# Patient Record
Sex: MALE | Race: WHITE | NOT HISPANIC OR LATINO | Employment: OTHER | URBAN - METROPOLITAN AREA
[De-identification: names, ages, dates, MRNs, and addresses within clinical notes are randomized per-mention and may not be internally consistent; named-entity substitution may affect disease eponyms.]

---

## 2017-01-10 ENCOUNTER — OFFICE VISIT - HEALTHEAST (OUTPATIENT)
Dept: FAMILY MEDICINE | Facility: CLINIC | Age: 42
End: 2017-01-10

## 2017-01-10 DIAGNOSIS — R41.83 BORDERLINE INTELLECTUAL FUNCTIONING: ICD-10-CM

## 2017-01-10 DIAGNOSIS — F63.81 INTERMITTENT EXPLOSIVE DISORDER: ICD-10-CM

## 2017-01-10 ASSESSMENT — MIFFLIN-ST. JEOR: SCORE: 1835.16

## 2017-01-13 ENCOUNTER — COMMUNICATION - HEALTHEAST (OUTPATIENT)
Dept: ADMINISTRATIVE | Facility: CLINIC | Age: 42
End: 2017-01-13

## 2017-02-06 ENCOUNTER — COMMUNICATION - HEALTHEAST (OUTPATIENT)
Dept: ADMINISTRATIVE | Facility: CLINIC | Age: 42
End: 2017-02-06

## 2017-02-07 ENCOUNTER — COMMUNICATION - HEALTHEAST (OUTPATIENT)
Dept: FAMILY MEDICINE | Facility: CLINIC | Age: 42
End: 2017-02-07

## 2018-08-03 ENCOUNTER — RECORDS - HEALTHEAST (OUTPATIENT)
Dept: LAB | Facility: CLINIC | Age: 43
End: 2018-08-03

## 2018-08-03 LAB
ALBUMIN SERPL-MCNC: 3.9 G/DL (ref 3.5–5)
ALP SERPL-CCNC: 93 U/L (ref 45–120)
ALT SERPL W P-5'-P-CCNC: 31 U/L (ref 0–45)
ANION GAP SERPL CALCULATED.3IONS-SCNC: 12 MMOL/L (ref 5–18)
AST SERPL W P-5'-P-CCNC: 25 U/L (ref 0–40)
BILIRUB SERPL-MCNC: 0.5 MG/DL (ref 0–1)
BUN SERPL-MCNC: 14 MG/DL (ref 8–22)
CALCIUM SERPL-MCNC: 8.9 MG/DL (ref 8.5–10.5)
CHLORIDE BLD-SCNC: 103 MMOL/L (ref 98–107)
CHOLEST SERPL-MCNC: 159 MG/DL
CO2 SERPL-SCNC: 27 MMOL/L (ref 22–31)
CREAT SERPL-MCNC: 0.9 MG/DL (ref 0.7–1.3)
FASTING STATUS PATIENT QL REPORTED: YES
GFR SERPL CREATININE-BSD FRML MDRD: >60 ML/MIN/1.73M2
GLUCOSE BLD-MCNC: 91 MG/DL (ref 70–125)
HDLC SERPL-MCNC: 29 MG/DL
LDLC SERPL CALC-MCNC: 61 MG/DL
POTASSIUM BLD-SCNC: 3.6 MMOL/L (ref 3.5–5)
PROT SERPL-MCNC: 7.7 G/DL (ref 6–8)
SODIUM SERPL-SCNC: 142 MMOL/L (ref 136–145)
TRIGL SERPL-MCNC: 346 MG/DL

## 2018-08-06 LAB — 25(OH)D3 SERPL-MCNC: 16.2 NG/ML (ref 30–80)

## 2018-10-03 ENCOUNTER — RECORDS - HEALTHEAST (OUTPATIENT)
Dept: LAB | Facility: HOSPITAL | Age: 43
End: 2018-10-03

## 2018-10-03 LAB
ANION GAP SERPL CALCULATED.3IONS-SCNC: 12 MMOL/L (ref 5–18)
CHLORIDE BLD-SCNC: 102 MMOL/L (ref 98–107)
CO2 SERPL-SCNC: 29 MMOL/L (ref 22–31)
LEVETIRACETAM (KEPPRA): 19 UG/ML (ref 6–46)
POTASSIUM BLD-SCNC: 3.4 MMOL/L (ref 3.5–5)
SODIUM SERPL-SCNC: 143 MMOL/L (ref 136–145)

## 2018-10-05 LAB — METHYLMALONATE SERPL-SCNC: 0.65 UMOL/L (ref 0–0.4)

## 2018-10-19 ENCOUNTER — RECORDS - HEALTHEAST (OUTPATIENT)
Dept: LAB | Facility: CLINIC | Age: 43
End: 2018-10-19

## 2018-10-19 LAB — 25(OH)D3 SERPL-MCNC: 26 NG/ML (ref 30–80)

## 2019-03-25 ENCOUNTER — RECORDS - HEALTHEAST (OUTPATIENT)
Dept: LAB | Facility: HOSPITAL | Age: 44
End: 2019-03-25

## 2019-03-25 LAB — LEVETIRACETAM (KEPPRA): 23.1 UG/ML (ref 6–46)

## 2019-07-17 ENCOUNTER — RECORDS - HEALTHEAST (OUTPATIENT)
Dept: LAB | Facility: CLINIC | Age: 44
End: 2019-07-17

## 2019-07-17 LAB
ALBUMIN SERPL-MCNC: 4.3 G/DL (ref 3.5–5)
ALP SERPL-CCNC: 107 U/L (ref 45–120)
ALT SERPL W P-5'-P-CCNC: 26 U/L (ref 0–45)
ANION GAP SERPL CALCULATED.3IONS-SCNC: 15 MMOL/L (ref 5–18)
AST SERPL W P-5'-P-CCNC: 21 U/L (ref 0–40)
BILIRUB SERPL-MCNC: 0.3 MG/DL (ref 0–1)
BUN SERPL-MCNC: 12 MG/DL (ref 8–22)
CALCIUM SERPL-MCNC: 10.2 MG/DL (ref 8.5–10.5)
CHLORIDE BLD-SCNC: 102 MMOL/L (ref 98–107)
CHOLEST SERPL-MCNC: 177 MG/DL
CO2 SERPL-SCNC: 25 MMOL/L (ref 22–31)
CREAT SERPL-MCNC: 0.89 MG/DL (ref 0.7–1.3)
FASTING STATUS PATIENT QL REPORTED: YES
GFR SERPL CREATININE-BSD FRML MDRD: >60 ML/MIN/1.73M2
GLUCOSE BLD-MCNC: 78 MG/DL (ref 70–125)
HDLC SERPL-MCNC: 29 MG/DL
LDLC SERPL CALC-MCNC: 71 MG/DL
LDLC SERPL CALC-MCNC: ABNORMAL MG/DL
MAGNESIUM SERPL-MCNC: 2 MG/DL (ref 1.8–2.6)
POTASSIUM BLD-SCNC: 3.5 MMOL/L (ref 3.5–5)
PROT SERPL-MCNC: 8.3 G/DL (ref 6–8)
SODIUM SERPL-SCNC: 142 MMOL/L (ref 136–145)
TRIGL SERPL-MCNC: 606 MG/DL
VIT B12 SERPL-MCNC: >2000 PG/ML (ref 213–816)

## 2019-07-18 LAB — 25(OH)D3 SERPL-MCNC: 45 NG/ML (ref 30–80)

## 2019-11-04 ENCOUNTER — HOSPITAL ENCOUNTER (EMERGENCY)
Facility: CLINIC | Age: 44
Discharge: HOME | End: 2019-11-04
Attending: PHYSICIAN ASSISTANT | Admitting: PHYSICIAN ASSISTANT
Payer: COMMERCIAL

## 2019-11-04 ENCOUNTER — APPOINTMENT (OUTPATIENT)
Dept: CT IMAGING | Facility: CLINIC | Age: 44
End: 2019-11-04
Attending: PHYSICIAN ASSISTANT
Payer: COMMERCIAL

## 2019-11-04 ENCOUNTER — APPOINTMENT (OUTPATIENT)
Dept: GENERAL RADIOLOGY | Facility: CLINIC | Age: 44
End: 2019-11-04
Attending: PHYSICIAN ASSISTANT
Payer: COMMERCIAL

## 2019-11-04 VITALS
WEIGHT: 218 LBS | DIASTOLIC BLOOD PRESSURE: 103 MMHG | TEMPERATURE: 97.5 F | HEART RATE: 88 BPM | HEIGHT: 72 IN | BODY MASS INDEX: 29.53 KG/M2 | RESPIRATION RATE: 18 BRPM | OXYGEN SATURATION: 97 % | SYSTOLIC BLOOD PRESSURE: 163 MMHG

## 2019-11-04 DIAGNOSIS — S42.291A HUMERAL HEAD FRACTURE, RIGHT, CLOSED, INITIAL ENCOUNTER: ICD-10-CM
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
## 2019-11-04 DIAGNOSIS — M54.2 NECK PAIN: ICD-10-CM

## 2019-11-04 DIAGNOSIS — R56.9 SEIZURE (H): ICD-10-CM

## 2019-11-04 LAB
ALBUMIN SERPL-MCNC: 3.6 G/DL (ref 3.4–5)
ALP SERPL-CCNC: 96 U/L (ref 40–150)
ALT SERPL W P-5'-P-CCNC: 36 U/L (ref 0–70)
ANION GAP SERPL CALCULATED.3IONS-SCNC: 4 MMOL/L (ref 3–14)
AST SERPL W P-5'-P-CCNC: 22 U/L (ref 0–45)
BASOPHILS # BLD AUTO: 0 10E9/L (ref 0–0.2)
BASOPHILS NFR BLD AUTO: 0.4 %
BILIRUB SERPL-MCNC: 0.2 MG/DL (ref 0.2–1.3)
BUN SERPL-MCNC: 18 MG/DL (ref 7–30)
CALCIUM SERPL-MCNC: 8.9 MG/DL (ref 8.5–10.1)
CHLORIDE SERPL-SCNC: 106 MMOL/L (ref 94–109)
CO2 SERPL-SCNC: 30 MMOL/L (ref 20–32)
CREAT SERPL-MCNC: 0.93 MG/DL (ref 0.66–1.25)
DIFFERENTIAL METHOD BLD: ABNORMAL
EOSINOPHIL # BLD AUTO: 0.1 10E9/L (ref 0–0.7)
EOSINOPHIL NFR BLD AUTO: 0.8 %
ERYTHROCYTE [DISTWIDTH] IN BLOOD BY AUTOMATED COUNT: 12.6 % (ref 10–15)
GFR SERPL CREATININE-BSD FRML MDRD: >90 ML/MIN/{1.73_M2}
GLUCOSE BLDC GLUCOMTR-MCNC: 135 MG/DL (ref 70–99)
GLUCOSE SERPL-MCNC: 99 MG/DL (ref 70–99)
HCT VFR BLD AUTO: 39.9 % (ref 40–53)
HGB BLD-MCNC: 12.8 G/DL (ref 13.3–17.7)
IMM GRANULOCYTES # BLD: 0.1 10E9/L (ref 0–0.4)
IMM GRANULOCYTES NFR BLD: 0.8 %
LYMPHOCYTES # BLD AUTO: 1.2 10E9/L (ref 0.8–5.3)
LYMPHOCYTES NFR BLD AUTO: 13.8 %
MCH RBC QN AUTO: 29.1 PG (ref 26.5–33)
MCHC RBC AUTO-ENTMCNC: 32.1 G/DL (ref 31.5–36.5)
MCV RBC AUTO: 91 FL (ref 78–100)
MONOCYTES # BLD AUTO: 0.5 10E9/L (ref 0–1.3)
MONOCYTES NFR BLD AUTO: 5.9 %
NEUTROPHILS # BLD AUTO: 7 10E9/L (ref 1.6–8.3)
NEUTROPHILS NFR BLD AUTO: 78.3 %
NRBC # BLD AUTO: 0 10*3/UL
NRBC BLD AUTO-RTO: 0 /100
PLATELET # BLD AUTO: 233 10E9/L (ref 150–450)
POTASSIUM SERPL-SCNC: 3.5 MMOL/L (ref 3.4–5.3)
PROT SERPL-MCNC: 7.9 G/DL (ref 6.8–8.8)
RBC # BLD AUTO: 4.4 10E12/L (ref 4.4–5.9)
SODIUM SERPL-SCNC: 140 MMOL/L (ref 133–144)
WBC # BLD AUTO: 9 10E9/L (ref 4–11)

## 2019-11-04 PROCEDURE — 85025 COMPLETE CBC W/AUTO DIFF WBC: CPT | Performed by: PHYSICIAN ASSISTANT

## 2019-11-04 PROCEDURE — 80177 DRUG SCRN QUAN LEVETIRACETAM: CPT | Performed by: PHYSICIAN ASSISTANT

## 2019-11-04 PROCEDURE — 70450 CT HEAD/BRAIN W/O DYE: CPT

## 2019-11-04 PROCEDURE — 99285 EMERGENCY DEPT VISIT HI MDM: CPT | Mod: 25 | Performed by: PHYSICIAN ASSISTANT

## 2019-11-04 PROCEDURE — 96361 HYDRATE IV INFUSION ADD-ON: CPT | Performed by: PHYSICIAN ASSISTANT

## 2019-11-04 PROCEDURE — 00000146 ZZHCL STATISTIC GLUCOSE BY METER IP

## 2019-11-04 PROCEDURE — 96360 HYDRATION IV INFUSION INIT: CPT | Performed by: PHYSICIAN ASSISTANT

## 2019-11-04 PROCEDURE — 99285 EMERGENCY DEPT VISIT HI MDM: CPT | Mod: Z6 | Performed by: PHYSICIAN ASSISTANT

## 2019-11-04 PROCEDURE — 80053 COMPREHEN METABOLIC PANEL: CPT | Performed by: PHYSICIAN ASSISTANT

## 2019-11-04 PROCEDURE — 73030 X-RAY EXAM OF SHOULDER: CPT | Mod: RT

## 2019-11-04 PROCEDURE — 72125 CT NECK SPINE W/O DYE: CPT

## 2019-11-04 PROCEDURE — 25800030 ZZH RX IP 258 OP 636: Performed by: PHYSICIAN ASSISTANT

## 2019-11-04 PROCEDURE — 73200 CT UPPER EXTREMITY W/O DYE: CPT | Mod: RT

## 2019-11-04 RX ORDER — ONDANSETRON 2 MG/ML
4 INJECTION INTRAMUSCULAR; INTRAVENOUS EVERY 30 MIN PRN
Status: DISCONTINUED | OUTPATIENT
Start: 2019-11-04 | End: 2019-11-04

## 2019-11-04 RX ORDER — ERGOCALCIFEROL 1.25 MG/1
50000 CAPSULE, LIQUID FILLED ORAL WEEKLY
COMMUNITY
End: 2023-12-06

## 2019-11-04 RX ORDER — LOSARTAN POTASSIUM 25 MG/1
25 TABLET ORAL 2 TIMES DAILY
COMMUNITY
End: 2021-11-19

## 2019-11-04 RX ORDER — CALCIUM CARBONATE 500 MG/1
1-2 TABLET, CHEWABLE ORAL DAILY PRN
COMMUNITY
End: 2023-12-06

## 2019-11-04 RX ORDER — LEVETIRACETAM 750 MG/1
1500 TABLET ORAL 2 TIMES DAILY
COMMUNITY
Start: 2018-03-20 | End: 2020-11-23

## 2019-11-04 RX ADMIN — SODIUM CHLORIDE 1000 ML: 9 INJECTION, SOLUTION INTRAVENOUS at 10:48

## 2019-11-04 ASSESSMENT — ENCOUNTER SYMPTOMS
GASTROINTESTINAL NEGATIVE: 1
SEIZURES: 1
NECK PAIN: 1
RESPIRATORY NEGATIVE: 1
CONSTITUTIONAL NEGATIVE: 1

## 2019-11-04 ASSESSMENT — MIFFLIN-ST. JEOR: SCORE: 1911.84

## 2019-11-04 NOTE — ED PROVIDER NOTES
"  History     Chief Complaint   Patient presents with     Seizures     2 min seizure in store parking lot at 0900-seizure hx-neck pain c collar placed in ED   rt shoulder pain-possiblt struck head     HPI  Joel Gray is a 44 year old male with history of seizure disorder, hypertension, mild intellectual disabilities, GERD, depression, anxiety who presents via ambulance for evaluation of right shoulder pain and neck pain following a seizure approximately 1-1/2 hours prior to arrival.  Patient arrives with his sister and sister-in-law who witnessed his seizure.  The patient was reportedly walking in the parking lot of a grocery store when he did not see a curb and tripped and fell, landing directly on his right shoulder.  He does not believe he hit his head.  Patient states he got into his van and subsequently \"felt like I was going to have a seizure.\"  Patient's family members state he did in fact start to have a generalized seizure that lasted approximately 2 minutes while in the seat of his band.  Patient's sister states that she stood in the doorway so patient would not fall out of the van.  Patient subsequently had a postictal period that lasted approximately 5 minutes.  He is now alert, oriented, and responding appropriately.  He states his right shoulder is still painful and he is unable to move it.  He denies history of shoulder dislocations.  Patient also complains of midline neck pain but denies headache.  He states he has been feeling well recently without any infectious symptoms.  He states he takes his Keppra 1500 twice daily.  He last took this last night.  Denies fevers, chills, cough, sore throat, nausea, vomiting, diarrhea, abdominal pain, chest pain, shortness of breath, urinary symptoms, or leg pain/swelling.  Patient sees a neurologist at [facility] in [place].  He states he thinks his last seizure was approximately 3 months ago.        Allergies:  Allergies   Allergen " Reactions     Acetaminophen Other (See Comments)     Seizure       Amoxicillin      Carbamazepine      Codeine Other (See Comments)     seizure     Divalproex Sodium [Valproic Acid] Other (See Comments)     Pancreatitis       Iodine Other (See Comments)     Seizure       Morphine      Diatrizoate Rash     Phenytoin Rash     Topamax [Topiramate] Other (See Comments)     Hallucinations         Problem List:    There are no active problems to display for this patient.       Past Medical History:    No past medical history on file.    Past Surgical History:    No past surgical history on file.    Family History:    No family history on file.    Social History:  Marital Status:    Social History     Tobacco Use     Smoking status: Not on file   Substance Use Topics     Alcohol use: Not on file     Drug use: Not on file        Medications:    calcium carbonate (TUMS) 500 MG chewable tablet  Cyanocobalamin (VITAMIN B-12 PO)  levETIRAcetam (KEPPRA) 750 MG tablet  losartan (COZAAR) 25 MG tablet  omeprazole (PRILOSEC) 20 MG DR capsule  vitamin D2 (ERGOCALCIFEROL) 31178 units (1250 mcg) capsule          Review of Systems   Constitutional: Negative.    HENT: Negative.    Respiratory: Negative.    Gastrointestinal: Negative.    Musculoskeletal: Positive for neck pain.        Right shoulder pain and limited ROM   Skin: Negative.    Neurological: Positive for seizures.   All other systems reviewed and are negative.      Physical Exam   BP: 134/87  Pulse: 84  Heart Rate: 71  Temp: 97.5  F (36.4  C)  Resp: 13  Height: 182.9 cm (6')  Weight: 98.9 kg (218 lb)  SpO2: 97 %      Physical Exam  Constitutional:       General: He is not in acute distress.     Appearance: He is well-developed. He is not ill-appearing, toxic-appearing or diaphoretic.      Interventions: Cervical collar in place.   HENT:      Head: Normocephalic and atraumatic. No raccoon eyes, abrasion, contusion or laceration.      Jaw: There is normal jaw occlusion.       Right Ear: Tympanic membrane, ear canal and external ear normal. No hemotympanum.      Left Ear: Tympanic membrane, ear canal and external ear normal. No hemotympanum.      Nose: Nose normal. No rhinorrhea.      Mouth/Throat:      Lips: Pink.      Mouth: Mucous membranes are moist.      Tongue: No lesions.      Pharynx: Oropharynx is clear. No oropharyngeal exudate or posterior oropharyngeal erythema.      Comments: No tongue laceration  Eyes:      Conjunctiva/sclera: Conjunctivae normal.      Pupils: Pupils are equal, round, and reactive to light.   Neck:      Musculoskeletal: Neck supple. Spinous process tenderness present. No edema or neck rigidity.   Cardiovascular:      Rate and Rhythm: Normal rate and regular rhythm.      Heart sounds: Normal heart sounds.   Pulmonary:      Effort: Pulmonary effort is normal. No respiratory distress.      Breath sounds: Normal breath sounds and air entry. No stridor, decreased air movement or transmitted upper airway sounds. No decreased breath sounds, wheezing, rhonchi or rales.   Abdominal:      General: Bowel sounds are normal.      Palpations: Abdomen is soft.      Tenderness: There is no tenderness.   Musculoskeletal: Normal range of motion.      Right shoulder: He exhibits bony tenderness, swelling and deformity.      Left shoulder: Normal. He exhibits normal range of motion, no tenderness, no bony tenderness, no swelling and no deformity.      Right elbow: Normal.     Cervical back: He exhibits bony tenderness.      Right upper arm: Normal.   Lymphadenopathy:      Cervical: No cervical adenopathy.   Skin:     General: Skin is warm and dry.   Neurological:      General: No focal deficit present.      Mental Status: He is alert and oriented to person, place, and time. Mental status is at baseline.      Cranial Nerves: Cranial nerves are intact.      Sensory: Sensation is intact.      Motor: Motor function is intact.      Coordination: Coordination is intact.    Psychiatric:         Mood and Affect: Mood normal.         Behavior: Behavior is cooperative.         ED Course        Procedures      No results found for this or any previous visit (from the past 24 hour(s)).     Results for orders placed or performed during the hospital encounter of 11/04/19   Glucose by meter     Status: Abnormal   Result Value Ref Range    Glucose 135 (H) 70 - 99 mg/dL   CBC with platelets differential     Status: Abnormal   Result Value Ref Range    WBC 9.0 4.0 - 11.0 10e9/L    RBC Count 4.40 4.4 - 5.9 10e12/L    Hemoglobin 12.8 (L) 13.3 - 17.7 g/dL    Hematocrit 39.9 (L) 40.0 - 53.0 %    MCV 91 78 - 100 fl    MCH 29.1 26.5 - 33.0 pg    MCHC 32.1 31.5 - 36.5 g/dL    RDW 12.6 10.0 - 15.0 %    Platelet Count 233 150 - 450 10e9/L    Diff Method Automated Method     % Neutrophils 78.3 %    % Lymphocytes 13.8 %    % Monocytes 5.9 %    % Eosinophils 0.8 %    % Basophils 0.4 %    % Immature Granulocytes 0.8 %    Nucleated RBCs 0 0 /100    Absolute Neutrophil 7.0 1.6 - 8.3 10e9/L    Absolute Lymphocytes 1.2 0.8 - 5.3 10e9/L    Absolute Monocytes 0.5 0.0 - 1.3 10e9/L    Absolute Eosinophils 0.1 0.0 - 0.7 10e9/L    Absolute Basophils 0.0 0.0 - 0.2 10e9/L    Abs Immature Granulocytes 0.1 0 - 0.4 10e9/L    Absolute Nucleated RBC 0.0    Comprehensive metabolic panel     Status: None   Result Value Ref Range    Sodium 140 133 - 144 mmol/L    Potassium 3.5 3.4 - 5.3 mmol/L    Chloride 106 94 - 109 mmol/L    Carbon Dioxide 30 20 - 32 mmol/L    Anion Gap 4 3 - 14 mmol/L    Glucose 99 70 - 99 mg/dL    Urea Nitrogen 18 7 - 30 mg/dL    Creatinine 0.93 0.66 - 1.25 mg/dL    GFR Estimate >90 >60 mL/min/[1.73_m2]    GFR Estimate If Black >90 >60 mL/min/[1.73_m2]    Calcium 8.9 8.5 - 10.1 mg/dL    Bilirubin Total 0.2 0.2 - 1.3 mg/dL    Albumin 3.6 3.4 - 5.0 g/dL    Protein Total 7.9 6.8 - 8.8 g/dL    Alkaline Phosphatase 96 40 - 150 U/L    ALT 36 0 - 70 U/L    AST 22 0 - 45 U/L   Keppra (Levetiracetam) Level      "Status: None   Result Value Ref Range    Keppra (Levetiracetam) Level 17 12 - 46 ug/mL         Medications   0.9% sodium chloride BOLUS (0 mLs Intravenous Stopped 11/4/19 1421)       Assessments & Plan (with Medical Decision Making)     Pt is a 44 year old male with history of seizure disorder, hypertension, mild intellectual disabilities, GERD, depression, anxiety who presents via ambulance for evaluation of right shoulder pain and neck pain following a seizure approximately 1-1/2 hours prior to arrival.  Pt patient was reportedly walking in the parking lot of a grocery store when he did not see a curb and tripped and fell, landing directly on his right shoulder.  He does not believe he hit his head.  Patient states he got into his van and subsequently \"felt like I was going to have a seizure.\"  Patient's family members state he did in fact start to have a generalized seizure that lasted approximately 2 minutes while in the seat of his band.  Patient subsequently had a postictal period that lasted approximately 5 minutes.  He is now alert, oriented, and responding appropriately.  He states his right shoulder is still painful and he is unable to move it.  Pt takes his Keppra 1500 twice daily.  Pt is afebrile on arrival.  Exam as above.  CT of patient's head and neck were obtained given his trauma followed by seizure.  CT of the head was negative for intracranial hemorrhage or acute pathology.  CT of the cervical spine was negative for fracture.  X-ray of the right shoulder shows concern for possible impacted fracture of the right humeral head and therefore CT scan was recommended in order to confirm.  Therefore a CT of the right shoulder was obtained and shows an impaction fracture of the anterior humeral head (reverse Hill-Sachs fracture).  Discussed results with patient.  Suspect patient dislocated his right shoulder when he fell directly on this right shoulder prior to developing a seizure.  Patient states when " "he was getting x-rays of his right shoulder, he felt a \"pop.\"  I suspect this is when patient's dislocation spontaneously reduced.  Patient was placed in a sling given his humeral head impaction fracture.  He is declining any pain medication at this time and states he does not tolerate any sort of pain medication or over-the-counter NSAIDs.  Patient's Keppra levels are currently pending.  He has a follow-up with his neurologist in a couple weeks.  Return precautions were reviewed.  Hand-outs were provided.    Patient was instructed to follow-up with orthopedics in 1 to 2 days for continued care and management (referral was made).  He is to return to the ED for persistent and/or worsening symptoms.  Patient expressed understanding of the diagnosis and plan and was discharged home in good condition.    I have reviewed the nursing notes.    I have reviewed the findings, diagnosis, plan and need for follow up with the patient.    Discharge Medication List as of 11/4/2019  2:21 PM          Final diagnoses:   Humeral head fracture, right, closed, initial encounter   Seizure (H)   Neck pain   Fall, initial encounter       11/4/2019   [facility] EMERGENCY DEPARTMENT      Disclaimer:  This note consists of symbols derived from keyboarding, dictation and/or voice recognition software.  As a result, there may be errors in the script that have gone undetected.  Please consider this when interpreting information found in this chart.     Yessi Pearson PA-C  11/05/19 2301    "

## 2019-11-04 NOTE — ED AVS SNAPSHOT
[facility] Emergency Department  [address] [address]  Phone:  [phone]  Fax:  [phone]                                    Joel Gray   MRN: [id number]    Department:  [facility] Emergency Department   Date of Visit:  11/4/2019           After Visit Summary Signature Page    I have received my discharge instructions, and my questions have been answered. I have discussed any challenges I see with this plan with the nurse or doctor.    ..........................................................................................................................................  Patient/Patient Representative Signature      ..........................................................................................................................................  Patient Representative Print Name and Relationship to Patient    ..................................................               ................................................  Date                                   Time    ..........................................................................................................................................  Reviewed by Signature/Title    ...................................................              ..............................................  Date                                               Time          22EPIC Rev 08/18

## 2019-11-04 NOTE — ED NOTES
2 min GM seizure in store parking lot at 0900-seizure hx with last seizure approx 3 mos ago-rt shoulder and neck pain-c collar placed in ED-seizure pads placed-ice to rt shoulder

## 2019-11-05 LAB — LEVETIRACETAM SERPL-MCNC: 17 UG/ML (ref 12–46)

## 2019-11-25 DIAGNOSIS — G40.209 COMPLEX PARTIAL SEIZURES WITH CONSCIOUSNESS IMPAIRED (H): Primary | ICD-10-CM

## 2019-11-25 PROCEDURE — 36415 COLL VENOUS BLD VENIPUNCTURE: CPT | Performed by: FAMILY MEDICINE

## 2019-11-25 PROCEDURE — 80177 DRUG SCRN QUAN LEVETIRACETAM: CPT | Mod: 90 | Performed by: FAMILY MEDICINE

## 2019-11-25 PROCEDURE — 99000 SPECIMEN HANDLING OFFICE-LAB: CPT | Performed by: FAMILY MEDICINE

## 2019-11-26 LAB — LEVETIRACETAM SERPL-MCNC: 26 UG/ML (ref 12–46)

## 2020-07-13 PROBLEM — R41.83 BORDERLINE INTELLECTUAL FUNCTIONING: Status: ACTIVE | Noted: 2017-01-10

## 2020-07-13 PROBLEM — F06.30 MOOD DISORDER DUE TO A GENERAL MEDICAL CONDITION: Status: ACTIVE | Noted: 2020-07-13

## 2020-07-13 PROBLEM — R44.3 HALLUCINATIONS: Status: ACTIVE | Noted: 2018-09-12

## 2020-09-29 ENCOUNTER — TELEPHONE (OUTPATIENT)
Dept: NEUROLOGY | Facility: CLINIC | Age: 45
End: 2020-09-29

## 2020-09-29 NOTE — TELEPHONE ENCOUNTER
Alysia from his group home where he moved to has called us to update all his info, including new pharmacy info. He now uses [facility] at [address] [place] [address]. [place] [address] phone number is [phone] and fax number is [phone] Thank you!

## 2020-10-17 ENCOUNTER — APPOINTMENT (OUTPATIENT)
Dept: GENERAL RADIOLOGY | Facility: CLINIC | Age: 45
End: 2020-10-17
Attending: NURSE PRACTITIONER
Payer: COMMERCIAL

## 2020-10-17 ENCOUNTER — HOSPITAL ENCOUNTER (EMERGENCY)
Facility: CLINIC | Age: 45
Discharge: HOME | End: 2020-10-17
Attending: NURSE PRACTITIONER | Admitting: NURSE PRACTITIONER
Payer: COMMERCIAL

## 2020-10-17 VITALS
DIASTOLIC BLOOD PRESSURE: 101 MMHG | HEART RATE: 100 BPM | TEMPERATURE: 97 F | RESPIRATION RATE: 18 BRPM | SYSTOLIC BLOOD PRESSURE: 156 MMHG | WEIGHT: 220 LBS | BODY MASS INDEX: 29.84 KG/M2 | OXYGEN SATURATION: 96 %

## 2020-10-17 DIAGNOSIS — Z20.822 ENCOUNTER FOR LABORATORY TESTING FOR COVID-19 VIRUS: ICD-10-CM

## 2020-10-17 DIAGNOSIS — R00.0 TACHYCARDIA: ICD-10-CM

## 2020-10-17 LAB
ANION GAP SERPL CALCULATED.3IONS-SCNC: 4 MMOL/L (ref 3–14)
BASOPHILS # BLD AUTO: 0.1 10E9/L (ref 0–0.2)
BASOPHILS NFR BLD AUTO: 0.6 %
BUN SERPL-MCNC: 16 MG/DL (ref 7–30)
CALCIUM SERPL-MCNC: 9.5 MG/DL (ref 8.5–10.1)
CHLORIDE SERPL-SCNC: 103 MMOL/L (ref 94–109)
CO2 SERPL-SCNC: 32 MMOL/L (ref 20–32)
CREAT SERPL-MCNC: 0.98 MG/DL (ref 0.66–1.25)
D DIMER PPP FEU-MCNC: <0.3 UG/ML FEU (ref 0–0.5)
DIFFERENTIAL METHOD BLD: NORMAL
EOSINOPHIL # BLD AUTO: 0.1 10E9/L (ref 0–0.7)
EOSINOPHIL NFR BLD AUTO: 1.6 %
ERYTHROCYTE [DISTWIDTH] IN BLOOD BY AUTOMATED COUNT: 12.6 % (ref 10–15)
GFR SERPL CREATININE-BSD FRML MDRD: >90 ML/MIN/{1.73_M2}
GLUCOSE SERPL-MCNC: 83 MG/DL (ref 70–99)
HCT VFR BLD AUTO: 41.3 % (ref 40–53)
HGB BLD-MCNC: 13.6 G/DL (ref 13.3–17.7)
IMM GRANULOCYTES # BLD: 0 10E9/L (ref 0–0.4)
IMM GRANULOCYTES NFR BLD: 0.2 %
LYMPHOCYTES # BLD AUTO: 1.3 10E9/L (ref 0.8–5.3)
LYMPHOCYTES NFR BLD AUTO: 15.8 %
MCH RBC QN AUTO: 30.2 PG (ref 26.5–33)
MCHC RBC AUTO-ENTMCNC: 32.9 G/DL (ref 31.5–36.5)
MCV RBC AUTO: 92 FL (ref 78–100)
MONOCYTES # BLD AUTO: 0.7 10E9/L (ref 0–1.3)
MONOCYTES NFR BLD AUTO: 8 %
NEUTROPHILS # BLD AUTO: 6 10E9/L (ref 1.6–8.3)
NEUTROPHILS NFR BLD AUTO: 73.8 %
NRBC # BLD AUTO: 0 10*3/UL
NRBC BLD AUTO-RTO: 0 /100
PLATELET # BLD AUTO: 297 10E9/L (ref 150–450)
POTASSIUM SERPL-SCNC: 3.9 MMOL/L (ref 3.4–5.3)
RBC # BLD AUTO: 4.5 10E12/L (ref 4.4–5.9)
SODIUM SERPL-SCNC: 139 MMOL/L (ref 133–144)
TROPONIN I SERPL-MCNC: <0.015 UG/L (ref 0–0.04)
WBC # BLD AUTO: 8.2 10E9/L (ref 4–11)

## 2020-10-17 PROCEDURE — 99285 EMERGENCY DEPT VISIT HI MDM: CPT | Mod: 25 | Performed by: NURSE PRACTITIONER

## 2020-10-17 PROCEDURE — 80048 BASIC METABOLIC PNL TOTAL CA: CPT | Performed by: NURSE PRACTITIONER

## 2020-10-17 PROCEDURE — 85379 FIBRIN DEGRADATION QUANT: CPT | Performed by: NURSE PRACTITIONER

## 2020-10-17 PROCEDURE — 71045 X-RAY EXAM CHEST 1 VIEW: CPT

## 2020-10-17 PROCEDURE — 85025 COMPLETE CBC W/AUTO DIFF WBC: CPT | Performed by: NURSE PRACTITIONER

## 2020-10-17 PROCEDURE — U0003 INFECTIOUS AGENT DETECTION BY NUCLEIC ACID (DNA OR RNA); SEVERE ACUTE RESPIRATORY SYNDROME CORONAVIRUS 2 (SARS-COV-2) (CORONAVIRUS DISEASE [COVID-19]), AMPLIFIED PROBE TECHNIQUE, MAKING USE OF HIGH THROUGHPUT TECHNOLOGIES AS DESCRIBED BY CMS-2020-01-R: HCPCS | Performed by: NURSE PRACTITIONER

## 2020-10-17 PROCEDURE — 93005 ELECTROCARDIOGRAM TRACING: CPT | Performed by: NURSE PRACTITIONER

## 2020-10-17 PROCEDURE — 84484 ASSAY OF TROPONIN QUANT: CPT | Performed by: NURSE PRACTITIONER

## 2020-10-17 PROCEDURE — 93010 ELECTROCARDIOGRAM REPORT: CPT | Performed by: NURSE PRACTITIONER

## 2020-10-17 PROCEDURE — C9803 HOPD COVID-19 SPEC COLLECT: HCPCS | Performed by: NURSE PRACTITIONER

## 2020-10-17 RX ORDER — RISPERIDONE 2 MG/1
2 TABLET ORAL
COMMUNITY
Start: 2020-09-17 | End: 2021-11-19

## 2020-10-17 NOTE — ED PROVIDER NOTES
"  History     Chief Complaint   Patient presents with     Hypertension     back pain, SOA, feels \"off\"     HPI  Joel Gray is a 44 year old male with history of seizure disorder, hypertension, mild intellectual disabilities, GERD, depression, anxiety, episode of psychosis, aggressive behavior who presents to the ED with rapid heart, shortness of breath, and cough.  Pt states the rapid heart started this am around 9 am.    Pt states it has been doing this off and on for many years.    Pt states the cough has been noted in the shower for the past couple of week.  PT states drinking water helps.     Pt states the shortness of breath start this am when waking up. Activity aggravates this, rest relieves this.    Patient denies fever, aches, chills, sweats, ear pain, eye pain, throat pain, chest pain, cough, wheezing, shortness of breath, abdominal pain, nausea, vomiting, diarrhea, dysuria, speech difficulty, left or right-sided body weakness, mental confusion, thoughts of harming self.  Patient reports feeling well otherwise      Allergies:  Allergies   Allergen Reactions     Acetaminophen Other (See Comments)     Seizure       Amoxicillin      Carbamazepine      Codeine Other (See Comments)     seizure     Divalproex Sodium [Valproic Acid] Other (See Comments)     Pancreatitis       Iodine Other (See Comments)     Seizure       Morphine      Diatrizoate Rash     Phenytoin Rash     Topamax [Topiramate] Other (See Comments)     Hallucinations         Problem List:    Patient Active Problem List    Diagnosis Date Noted     Mood disorder due to a general medical condition 07/13/2020     Priority: Medium     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 Covarrubias       Intermittent explosive disorder 09/05/2015     Priority: Medium     " Psychosis (H) 09/04/2015     Priority: Medium     HTN (hypertension) 08/30/2009     Priority: Medium     Updated by system to replace inactive record  Updated by system to replace inactive record       Dysphagia 01/30/2009     Priority: Medium     Dysphonia 01/30/2009     Priority: Medium     Anxiety state 04/20/2006     Priority: Medium     Depressive disorder 04/20/2006     Priority: Medium     Esophageal reflux 04/20/2006     Priority: Medium        Past Medical History:    No past medical history on file.    Past Surgical History:    No past surgical history on file.    Family History:    No family history on file.    Social History:  Marital Status:  Single [1]  Social History     Tobacco Use     Smoking status: Not on file   Substance Use Topics     Alcohol use: Not on file     Drug use: Not on file        Medications:         risperiDONE (RISPERDAL) 2 MG tablet       calcium carbonate (TUMS) 500 MG chewable tablet       Cyanocobalamin (VITAMIN B-12 PO)       levETIRAcetam (KEPPRA) 750 MG tablet       losartan (COZAAR) 25 MG tablet       omeprazole (PRILOSEC) 20 MG DR capsule       vitamin D2 (ERGOCALCIFEROL) 05300 units (1250 mcg) capsule          Review of Systems  As mentioned above in the history present illness. All other systems were reviewed and are negative.    Physical Exam   BP: (!) 151/86  Pulse: 109  Temp: 97  F (36.1  C)  Resp: 16  Weight: 99.8 kg (220 lb)  SpO2: 96 %      Physical Exam  Vitals signs and nursing note reviewed.   Constitutional:       General: He is not in acute distress.     Appearance: Normal appearance. He is well-developed and normal weight. He is not ill-appearing, toxic-appearing or diaphoretic.   HENT:      Head: Normocephalic and atraumatic.      Right Ear: Tympanic membrane, ear canal and external ear normal.      Left Ear: Tympanic membrane, ear canal and external ear normal.      Nose: Nose normal.      Mouth/Throat:      Mouth: Mucous membranes are moist.      Pharynx:  No oropharyngeal exudate or posterior oropharyngeal erythema.   Eyes:      General:         Right eye: No discharge.         Left eye: No discharge.      Extraocular Movements: Extraocular movements intact.      Conjunctiva/sclera: Conjunctivae normal.      Pupils: Pupils are equal, round, and reactive to light.   Cardiovascular:      Rate and Rhythm: Regular rhythm. Tachycardia present.      Heart sounds: Normal heart sounds. No murmur. No friction rub. No gallop.    Pulmonary:      Effort: Pulmonary effort is normal.      Breath sounds: Normal breath sounds. No stridor. No wheezing.   Abdominal:      General: Abdomen is flat. Bowel sounds are normal. There is no distension.      Palpations: Abdomen is soft.      Tenderness: There is no abdominal tenderness. There is no right CVA tenderness, left CVA tenderness, guarding or rebound.      Hernia: No hernia is present.   Skin:     General: Skin is warm.      Capillary Refill: Capillary refill takes less than 2 seconds.      Findings: No rash.   Neurological:      General: No focal deficit present.      Mental Status: He is alert and oriented to person, place, and time.   Psychiatric:         Mood and Affect: Mood normal.         Behavior: Behavior normal.       As mentioned above in the history present illness. All other systems were reviewed and are negative.    ED Course     ED Course as of Oct 17 1821   Sat Oct 17, 2020   1711 Called into room and discussed lab results including unremarkable troponin, negative D-dimer for possible DVT or PE, normal basic metabolic panel, unremarkable CBC.  Advised ordered portable chest x-ray and will see where to go from there.  Discussed etiology of elevated heart rate may be lack of fluids, viral etiology including Covid.  Patient verbalized understanding.  Patient states no worsening of symptoms and states he feels fine presently.        Procedures         EKG Interpretation:      Interpreted by YAMILA Gallagher CNP,  Jaleel Malagon MD  Time reviewed: 1625  Symptoms at time of EKG: short of breath   Rhythm: normal sinus   Rate: Tachycardia  Axis: Normal  Ectopy: none  ST Segments/ T Waves: No ST-T wave changes and No acute ischemic changes  Q Waves: II, III and aVf  Comparison to prior: No old EKG available    Clinical Impression: no acute changes    Results for orders placed or performed during the hospital encounter of 10/17/20 (from the past 24 hour(s))   CBC with platelets differential   Result Value Ref Range    WBC 8.2 4.0 - 11.0 10e9/L    RBC Count 4.50 4.4 - 5.9 10e12/L    Hemoglobin 13.6 13.3 - 17.7 g/dL    Hematocrit 41.3 40.0 - 53.0 %    MCV 92 78 - 100 fl    MCH 30.2 26.5 - 33.0 pg    MCHC 32.9 31.5 - 36.5 g/dL    RDW 12.6 10.0 - 15.0 %    Platelet Count 297 150 - 450 10e9/L    Diff Method Automated Method     % Neutrophils 73.8 %    % Lymphocytes 15.8 %    % Monocytes 8.0 %    % Eosinophils 1.6 %    % Basophils 0.6 %    % Immature Granulocytes 0.2 %    Nucleated RBCs 0 0 /100    Absolute Neutrophil 6.0 1.6 - 8.3 10e9/L    Absolute Lymphocytes 1.3 0.8 - 5.3 10e9/L    Absolute Monocytes 0.7 0.0 - 1.3 10e9/L    Absolute Eosinophils 0.1 0.0 - 0.7 10e9/L    Absolute Basophils 0.1 0.0 - 0.2 10e9/L    Abs Immature Granulocytes 0.0 0 - 0.4 10e9/L    Absolute Nucleated RBC 0.0    D dimer quantitative   Result Value Ref Range    D Dimer <0.3 0.0 - 0.50 ug/ml FEU   Basic metabolic panel   Result Value Ref Range    Sodium 139 133 - 144 mmol/L    Potassium 3.9 3.4 - 5.3 mmol/L    Chloride 103 94 - 109 mmol/L    Carbon Dioxide 32 20 - 32 mmol/L    Anion Gap 4 3 - 14 mmol/L    Glucose 83 70 - 99 mg/dL    Urea Nitrogen 16 7 - 30 mg/dL    Creatinine 0.98 0.66 - 1.25 mg/dL    GFR Estimate >90 >60 mL/min/[1.73_m2]    GFR Estimate If Black >90 >60 mL/min/[1.73_m2]    Calcium 9.5 8.5 - 10.1 mg/dL   Troponin I   Result Value Ref Range    Troponin I ES <0.015 0.000 - 0.045 ug/L   XR Chest Port 1 View    Narrative    XR CHEST PORT 1 VW  10/17/2020 5:47 PM    HISTORY: shortness of breath    COMPARISON: None.      Impression    IMPRESSION: No focal infiltrate, effusion or pneumothorax. The cardiac  and mediastinal silhouettes are within normal limits. Right upper  quadrant surgical clips.    RADHA BAZAN MD       Medications - No data to display    Assessments & Plan (with Medical Decision Making)  Joel Gray is a 44 year old male with history of seizure disorder, hypertension, mild intellectual disabilities, GERD, depression, anxiety, episode of psychosis, aggressive behavior who presents to the ED with rapid heart, shortness of breath, and cough.  Patient lives in a group home.  There is no one else presently with Covid type symptoms.  There is someone else in the group home recently diagnosed with bronchitis.  Patient denies any history of DVT, PE.  On exam patient is noted to be tachycardic, but denies chest pain and there is no appreciable shortness of air presently.  There is no respiratory distress.  Patient has 16 respirations per minute and O2 saturation of 96%.  Lung sounds are clear to auscultation.  Remainder of exam is unremarkable.  CBC and basic metabolic panel completed.  D-dimer completed, EKG and troponin ordered to evaluate for possible STEMI or acute coronary syndrome.  D-dimer is negative, EKG and troponin are no signs of acute coronary are NSTEMI.  CBC and basic metabolic panel are unremarkable.  Chest x-ray is unremarkable.  Final differential viral upper respiratory illness, viral bronchitis, tachycardia due to decreased fluids and this is less likely.  Covid precautions reviewed.  Discharge instructions given.  Discussed worrisome reasons with which to return.  Patient discharged in stable condition.     I have reviewed the nursing notes.    I have reviewed the findings, diagnosis, plan and need for follow up with the patient.    New Prescriptions    No medications on file       Final diagnoses:   Tachycardia    Encounter for laboratory testing for COVID-19 virus       10/17/2020   [facility] EMERGENCY DEPT     Moni Haro, APRN CNP  10/17/20 8597

## 2020-10-17 NOTE — ED NOTES
Pt from group [place]. Pt is worried about his htn. Takes meds for it and took them this am. Also feeling soa x 1 hour. Denies any other sx

## 2020-10-17 NOTE — ED NOTES
Pt discharged with instructions and caregiver. Will follow up with MD next week or return to the ED. Denies SOB or headache on departure,

## 2020-10-17 NOTE — ED AVS SNAPSHOT
[facility]  [address] [address]  Phone: [phone]  Fax: [phone]                                    Joel Gray   MRN: [id number]    Department: [facility]   Date of Visit: 10/17/2020           After Visit Summary Signature Page    I have received my discharge instructions, and my questions have been answered. I have discussed any challenges I see with this plan with the nurse or doctor.    ..........................................................................................................................................  Patient/Patient Representative Signature      ..........................................................................................................................................  Patient Representative Print Name and Relationship to Patient    ..................................................               ................................................  Date                                   Time    ..........................................................................................................................................  Reviewed by Signature/Title    ...................................................              ..............................................  Date                                               Time          22EPIC Rev 08/18

## 2020-10-17 NOTE — DISCHARGE INSTRUCTIONS
Please increase your fluid intake.  You have been tested for the Covid virus.  I recommend self quarantine until the results are available.  If the Covid test is positive you need to continue to quarantine for 14 days.  Return to the emergency room if you have increasing shortness of breath or chest pain.

## 2020-10-18 LAB
SARS-COV-2 RNA SPEC QL NAA+PROBE: NOT DETECTED
SPECIMEN SOURCE: NORMAL

## 2020-10-19 ENCOUNTER — TELEPHONE (OUTPATIENT)
Dept: EMERGENCY MEDICINE | Facility: CLINIC | Age: 45
End: 2020-10-19

## 2020-10-19 NOTE — TELEPHONE ENCOUNTER
Coronavirus (COVID-19) Notification    Lab Result   Lab test 2019-nCoV rRt-PCR OR SARS-COV-2 PCR    Nasopharyngeal AND/OR Oropharyngeal swab is NEGATIVE for 2019-nCoV RNA [OR] SARS-COV-2 RNA (COVID-19) RNA    Your result was negative. This means that we didn't find the virus that causes COVID-19 in your sample. A test may show negative when you do actually have the virus. This can happen when the virus is in the early stages of infection, before you feel illness symptoms.    If you have symptoms   Stay home and away from others (self-isolate) until you meet ALL of the guidelines below:    You've had no fever--and no medicine that reduces fever--for 1 full day (24 hours). And      Your other symptoms have gotten better. For example, your cough or breathing has improved. And   ; At least 10 days have passed since your symptoms started. (If you've been told by a doctor that you have a weak immune system, wait 20 days.)         During this time:    Stay home. Don't go to work, school or anywhere else.     Stay in your own room, including for meals. Use your own bathroom if you can.    Stay away from others in your home. No hugging, kissing or shaking hands. No visitors.    Clean  high touch  surfaces often (doorknobs, counters, handles, etc.). Use a household cleaning spray or wipes. You can find a full list on the EPA website at www.epa.gov/pesticide-registration/list-n-disinfectants-use-against-sars-cov-2.    Cover your mouth and nose with a mask, tissue or other face covering to avoid spreading germs.    Wash your hands and face often with soap and water.    Going back to work  Check with your employer for any guidelines to follow for going back to work.  You are sent a letter for your Employer which will serve as formal document notice that you, the employee, tested negative for COVID-19, as of the testing date shown above.    If your symptoms worsen or other concerning symptoms, contact PCP, oncare or consider  returning to Emergency Dept.    Where can I get more information?    [facility]: [email or web address]    Coronavirus Basics: www.health.[facility].[place].us/diseases/coronavirus/basics.html    [facility] Hotline (721-812-3896)    {Name]  Crys Goodman RN  [facility] RN  Lung Nodule and ED Lab Result RN  Epic pool (ED late result f/u RN): [id number]  [facility] INCIDENTAL RADIOLOGY F/U NURSES: [id number]  Ph# [phone]

## 2020-11-19 ENCOUNTER — RECORDS - HEALTHEAST (OUTPATIENT)
Dept: LAB | Facility: CLINIC | Age: 45
End: 2020-11-19

## 2020-11-19 LAB
CHOLEST SERPL-MCNC: 181 MG/DL
FASTING STATUS PATIENT QL REPORTED: ABNORMAL
HDLC SERPL-MCNC: 42 MG/DL
LDLC SERPL CALC-MCNC: 89 MG/DL
TRIGL SERPL-MCNC: 249 MG/DL

## 2020-11-19 SDOH — HEALTH STABILITY: MENTAL HEALTH: HOW OFTEN DO YOU HAVE A DRINK CONTAINING ALCOHOL?: NOT ASKED

## 2020-11-19 SDOH — HEALTH STABILITY: MENTAL HEALTH: HOW MANY STANDARD DRINKS CONTAINING ALCOHOL DO YOU HAVE ON A TYPICAL DAY?: NOT ASKED

## 2020-11-19 SDOH — HEALTH STABILITY: MENTAL HEALTH: HOW OFTEN DO YOU HAVE 6 OR MORE DRINKS ON ONE OCCASION?: NOT ASKED

## 2020-11-20 LAB — 25(OH)D3 SERPL-MCNC: 53.2 NG/ML (ref 30–80)

## 2020-11-23 ENCOUNTER — TELEPHONE (OUTPATIENT)
Dept: NEUROLOGY | Facility: CLINIC | Age: 45
End: 2020-11-23

## 2020-11-23 ENCOUNTER — VIRTUAL VISIT (OUTPATIENT)
Dept: NEUROLOGY | Facility: CLINIC | Age: 45
End: 2020-11-23
Payer: COMMERCIAL

## 2020-11-23 VITALS — HEIGHT: 72 IN | BODY MASS INDEX: 26.68 KG/M2 | WEIGHT: 197 LBS

## 2020-11-23 DIAGNOSIS — E53.8 COBALAMIN DEFICIENCY: ICD-10-CM

## 2020-11-23 DIAGNOSIS — G40.209 PARTIAL SYMPTOMATIC EPILEPSY WITH COMPLEX PARTIAL SEIZURES, NOT INTRACTABLE, WITHOUT STATUS EPILEPTICUS (H): Primary | ICD-10-CM

## 2020-11-23 DIAGNOSIS — F44.5 PSEUDOSEIZURES: ICD-10-CM

## 2020-11-23 PROBLEM — R44.3 HALLUCINATIONS: Status: RESOLVED | Noted: 2018-09-12 | Resolved: 2020-11-23

## 2020-11-23 PROBLEM — E66.9 OBESITY: Status: ACTIVE | Noted: 2020-11-23

## 2020-11-23 PROBLEM — E55.9 VITAMIN D DEFICIENCY: Status: ACTIVE | Noted: 2020-11-23

## 2020-11-23 PROBLEM — F06.30 MOOD DISORDER DUE TO A GENERAL MEDICAL CONDITION: Status: RESOLVED | Noted: 2020-07-13 | Resolved: 2020-11-23

## 2020-11-23 PROBLEM — Z72.0 TOBACCO USER: Status: ACTIVE | Noted: 2020-11-23

## 2020-11-23 PROCEDURE — 99214 OFFICE O/P EST MOD 30 MIN: CPT | Mod: 95 | Performed by: PSYCHIATRY & NEUROLOGY

## 2020-11-23 RX ORDER — LEVETIRACETAM 750 MG/1
1500 TABLET ORAL 2 TIMES DAILY
Qty: 60 TABLET | Refills: 11 | Status: SHIPPED | OUTPATIENT
Start: 2020-11-23 | End: 2021-07-12

## 2020-11-23 ASSESSMENT — MIFFLIN-ST. JEOR: SCORE: 1811.59

## 2020-11-23 NOTE — NURSING NOTE
Component      Latest Ref Rng & Units 11/19/2020   Cholesterol      <=199 mg/dL 181   Triglycerides      <=149 mg/dL 249 (H)   HDL Cholesterol      >=40 mg/dL 42   LDL Calculated      <=129 mg/dL 89   Patient Fasting > 8hrs?       Unknown   Vitamin D, Total (25-Hydroxy)      30.0 - 80.0 ng/mL 53.2

## 2020-11-23 NOTE — PROGRESS NOTES
NEUROLOGY FOLLOW UP VISIT  NOTE       [facility]  [address] [place] [address]  Tel: [phone]  Fax: [phone]  [email or web address]     Joel Gray,  1975, MRN [id number]  PCP: Joan Fletcher, [phone]  Date: 2020     ASSESSMENT & PLAN     Diagnosis code:    Partial symptomatic epilepsy with complex partial seizures, not intractable, without status epilepticus (H)  Pseudoseizures     Complex partial seizures & pseudoseizures  45 years old male with history of pseudoseizures and complex partial seizures who returns for follow-up.  He claims he is having nocturnal seizures at least once a week but his description does not sound very convincing.  He claims he is taking medication regularly.  I have refilled his prescription for Keppra and I am checking a Keppra level and electrolyte panel.  He does have significant functional overlay and I have encouraged him to continue following up with a psychiatrist.    Vitamin B12 deficiency  Previously patient was noted to have vitamin B12 deficiency and currently takes high-dose vitamin B12.  I have recommended checking B12 level and if normal he can be switched to over-the-counter B12 supplements.  Follow-up will be in 1 year.    Thank you again for this referral, please feel free to contact me if you have any questions.    Hang Covarrubias MD  [facility]  (Former[facility], [facility]     HISTORY OF PRESENT ILLNESS     Patient is a 45 years old male with history of complex partial seizures, pseudoseizures, depression with anxiety, hypertension, vitamin B12 deficiency who returns for follow-up.  Patient reports that he is having increased seizures and also more confusion.  He had multiple such episodes in the past but work-up has always been negative.  This included admission to an epilepsy monitoring unit.  Patient initially was on valproic acid but  it resulted in pancreatitis and was started on Keppra.  He was also noted to have low vitamin B12 and was started on B12 supplements.  Since his last visit he reports that he continues to have seizures at least once a week.  He claims he gets the seizures when he is sleeping and usually knows he had a seizure as he is confused the next day.  I should note that similar symptoms in the past did not yield any abnormality on the testing and I am not too concerned that he is actually having seizures.  This especially true given his history of pseudoseizures in the past.    Patient started having seizures in 1997 and according to his girlfriend and that time he would be shaking at night that sounded more like shivering rather than a seizure. He was admitted to [place] [facility] and had 3 days of monitoring and although his EEG was normal he was subsequently started on Depakote more for mood stabilization. Since then he has been seen in the emergency room multiple times for recurrent seizures but they were felt to be pseudoseizures. Unfortunately while being on Depakote he developed pancreatitis and was taken off Depakote and switched to Keppra initially he had difficulty tolerating Keppra but afterwards he adjusted. He was also evaluated by neuropsychologist and was felt to have impaired range of overall intellectual ability and is being followed by psychological services for depression and impulse control disorder.     PROBLEM LIST   Patient Active Problem List   Diagnosis Code     Borderline intellectual functioning R41.83     Mixed anxiety depressive disorder F41.8     Dysphonia R49.0     Gastroesophageal reflux disease K21.9     Hypertension I10     Intermittent explosive disorder F63.81     Pseudoseizures F44.5     Psychosis (H) F29     Cobalamin deficiency E53.8     Epilepsy with partial complex seizures (H) G40.209     Obesity E66.9     Tobacco user Z72.0     Vitamin D deficiency E55.9         PAST MEDICAL &  SURGICAL HISTORY     Past Medical History:   Patient  has no past medical history on file.    Surgical History:  He  has no past surgical history on file.     SOCIAL HISTORY     Reviewed, and he  reports that he has quit smoking. He has never used smokeless tobacco. He reports previous alcohol use.     FAMILY HISTORY     Reviewed, and family history includes Diabetes in his mother; Hypertension in his mother; Seizure Disorder in his mother.     ALLERGIES     Allergies   Allergen Reactions     Aripiprazole      Other reaction(s): Agitation  Suicidal and terroristic thoughts     Acetaminophen Other (See Comments)     Seizure       Amoxicillin      Aspirin      Carbamazepine      Codeine Other (See Comments)     seizure     Divalproex Sodium [Valproic Acid] Other (See Comments)     Pancreatitis       Iodine Other (See Comments)     Seizure       Morphine      Diatrizoate Rash     Phenytoin Rash     Topamax [Topiramate] Other (See Comments)     Hallucinations           REVIEW OF SYSTEMS     A 12 point review of system was performed and was negative except as outlined in the history of present illness.     HOME MEDICATIONS       Current Outpatient Medications:      Cyanocobalamin (VITAMIN B-12 PO), Take 1 tablet by mouth 2 times daily, Disp: , Rfl:      levETIRAcetam (KEPPRA) 750 MG tablet, Take 2 tablets (1,500 mg) by mouth 2 times daily, Disp: 60 tablet, Rfl: 11     losartan (COZAAR) 25 MG tablet, Take 25 mg by mouth 2 times daily, Disp: , Rfl:      omeprazole (PRILOSEC) 20 MG DR capsule, Take 20 mg by mouth daily, Disp: , Rfl:      risperiDONE (RISPERDAL) 2 MG tablet, Take 2 mg by mouth daily before breakfast, Disp: , Rfl:      vitamin D2 (ERGOCALCIFEROL) 47269 units (1250 mcg) capsule, Take 50,000 Units by mouth once a week On Monday, Disp: , Rfl:      calcium carbonate (TUMS) 500 MG chewable tablet, Take 1-2 tablets by mouth daily as needed, Disp: , Rfl:       PHYSICAL EXAM     Vital signs  Ht 1.829 m (6')   Wt  89.4 kg (197 lb)   BMI 26.72 kg/m      Weight:   197 lbs 0 oz    Patient is alert and oriented x3 speech normal with no dysarthria or aphasia.  Hearing is normal he can tell me his name date and place.  His hearing appeared normal.  Rest of the exam was not possible due to telephone visit     DIAGNOSTIC STUDIES     PERTINENT RADIOLOGY  Following imaging studies were reviewed:     MRI 9/13/18  1. No acute/subacute infarcts, mass lesions, hydrocephalus or MRI evidence of intracranial hemorrhage. No abnormal intracranial enhancement.  2. Intracranial contents are stable compared to brain MRI 12/26/2014.    EEG 9/13/18  Impression: This is a normal awake and drowsy EEG     PERTINENT LABS  Following labs were reviewed:  Admission on 10/17/2020, Discharged on 10/17/2020   Component Date Value     WBC 10/17/2020 8.2      RBC Count 10/17/2020 4.50      Hemoglobin 10/17/2020 13.6      Hematocrit 10/17/2020 41.3      MCV 10/17/2020 92      MCH 10/17/2020 30.2      MCHC 10/17/2020 32.9      RDW 10/17/2020 12.6      Platelet Count 10/17/2020 297      Diff Method 10/17/2020 Automated Method      % Neutrophils 10/17/2020 73.8      % Lymphocytes 10/17/2020 15.8      % Monocytes 10/17/2020 8.0      % Eosinophils 10/17/2020 1.6      % Basophils 10/17/2020 0.6      % Immature Granulocytes 10/17/2020 0.2      Nucleated RBCs 10/17/2020 0      Absolute Neutrophil 10/17/2020 6.0      Absolute Lymphocytes 10/17/2020 1.3      Absolute Monocytes 10/17/2020 0.7      Absolute Eosinophils 10/17/2020 0.1      Absolute Basophils 10/17/2020 0.1      Abs Immature Granulocytes 10/17/2020 0.0      Absolute Nucleated RBC 10/17/2020 0.0      D Dimer 10/17/2020 <0.3      Sodium 10/17/2020 139      Potassium 10/17/2020 3.9      Chloride 10/17/2020 103      Carbon Dioxide 10/17/2020 32      Anion Gap 10/17/2020 4      Glucose 10/17/2020 83      Urea Nitrogen 10/17/2020 16      Creatinine 10/17/2020 0.98      GFR Estimate 10/17/2020 >90      GFR Estimate  "If Black 10/17/2020 >90      Calcium 10/17/2020 9.5      Troponin I ES 10/17/2020 <0.015      COVID-19 Virus PCR to U * 10/17/2020 Nasopharyngeal      COVID-19 Virus PCR to U * 10/17/2020 Not Detected       Component      Latest Ref Rng & Units 11/19/2020   Cholesterol      <=199 mg/dL 181   Triglycerides      <=149 mg/dL 249 (H)   HDL Cholesterol      >=40 mg/dL 42   LDL Calculated      <=129 mg/dL 89   Patient Fasting > 8hrs?       Unknown   Vitamin D, Total (25-Hydroxy)      30.0 - 80.0 ng/mL 53.2     TELEPHONE VISIT CONSENT   This telephone visit was done in lieu of a face to face visit due to COVID 19 pandemic. The patient has been notified of following:    \"This telephone visit will be conducted via a call between you and your physician/provider. We have found that certain health care needs can be provided without the need for a physical exam. This service lets us provide the care you need with a short phone conversation. If a prescription is necessary we can send it directly to your pharmacy. If lab work is needed we can place an order for that and you can then stop by our lab to have the test done at a later time. If during the course of the call the physician/provider feels a telephone visit is not appropriate, you will not be charged for this service.\"    Patient has given verbal consent for Telephone visit? YES  Consent has been obtained for this service by 1 care team member: YES    Total Time: visit 30 minutes     Total time spent for face to face visit, reviewing labs/imaging studies, counseling and coordination of care was: 30 Minutes More than 50% of this time was spent on counseling and coordination of care.      This note was dictated using voice recognition software.  Any grammatical or context distortions are unintentional and inherent to the software.         "

## 2020-11-23 NOTE — LETTER
"November 23, 2020      Joel Gray  [address] [place] [id number]      TREATMENT PLAN FOR SEIZURES    Treatment:  - Assist the patient to the floor, if needed.  - DO NOT put anything between teeth or in mouth.  - DO NOT restrain.  - Clear area to protect patient from injury.  - Start a written record of the seizure behavior and treatment including length of seizure activity.  - CALL 911 IF: seizure activity is different from \"usual seizure activity\" or  breathing is affected or if seizure lasts longer than five (5) minutes or patient fails to regain consciousness after  seizure activity has stopped.    If seizure lasts for less than 5 minutes, after a seizure:  - Permit patient to rest.  - Continue to document the episode.  - Monitor for second episode.  - Monitor for confusion or lack of consciousness.      Hang Covarrubias M.D.  [facility]  ([facility]            "

## 2020-11-23 NOTE — LETTER
2020         RE: Joel Gray  [address] [place] [id number]        Dear Colleague,    Thank you for referring your patient, Joel Gray, to the [facility] [place]. Please see a copy of my visit note below.    NEUROLOGY FOLLOW UP VISIT  NOTE       [facility] [place]  [address] [place] [address]  Tel: [phone]  Fax: [phone]  [email or web address]     Joel Gray,  1975, MRN [id number]  PCP: Joan Fletcher, [phone]  Date: 2020     ASSESSMENT & PLAN     Diagnosis code:    Partial symptomatic epilepsy with complex partial seizures, not intractable, without status epilepticus (H)  Pseudoseizures     Complex partial seizures & pseudoseizures  45 years old male with history of pseudoseizures and complex partial seizures who returns for follow-up.  He claims he is having nocturnal seizures at least once a week but his description does not sound very convincing.  He claims he is taking medication regularly.  I have refilled his prescription for Keppra and I am checking a Keppra level and electrolyte panel.  He does have significant functional overlay and I have encouraged him to continue following up with a psychiatrist.    Vitamin B12 deficiency  Previously patient was noted to have vitamin B12 deficiency and currently takes high-dose vitamin B12.  I have recommended checking B12 level and if normal he can be switched to over-the-counter B12 supplements.  Follow-up will be in 1 year.    Thank you again for this referral, please feel free to contact me if you have any questions.    Hang Covarrubias MD  [facility]  (Formerly, [facility]     HISTORY OF PRESENT ILLNESS     Patient is a 45 years old male with history of complex partial seizures, pseudoseizures, depression with anxiety, hypertension, vitamin B12 deficiency who returns for  follow-up.  Patient reports that he is having increased seizures and also more confusion.  He had multiple such episodes in the past but work-up has always been negative.  This included admission to an epilepsy monitoring unit.  Patient initially was on valproic acid but it resulted in pancreatitis and was started on Keppra.  He was also noted to have low vitamin B12 and was started on B12 supplements.  Since his last visit he reports that he continues to have seizures at least once a week.  He claims he gets the seizures when he is sleeping and usually knows he had a seizure as he is confused the next day.  I should note that similar symptoms in the past did not yield any abnormality on the testing and I am not too concerned that he is actually having seizures.  This especially true given his history of pseudoseizures in the past.    Patient started having seizures in 1997 and according to his girlfriend and that time he would be shaking at night that sounded more like shivering rather than a seizure. He was admitted to [facility] and had 3 days of monitoring and although his EEG was normal he was subsequently started on Depakote more for mood stabilization. Since then he has been seen in the emergency room multiple times for recurrent seizures but they were felt to be pseudoseizures. Unfortunately while being on Depakote he developed pancreatitis and was taken off Depakote and switched to Keppra initially he had difficulty tolerating Keppra but afterwards he adjusted. He was also evaluated by neuropsychologist and was felt to have impaired range of overall intellectual ability and is being followed by psychological services for depression and impulse control disorder.     PROBLEM LIST   Patient Active Problem List   Diagnosis Code     Borderline intellectual functioning R41.83     Mixed anxiety depressive disorder F41.8     Dysphonia R49.0     Gastroesophageal reflux disease K21.9     Hypertension  I10     Intermittent explosive disorder F63.81     Pseudoseizures F44.5     Psychosis (H) F29     Cobalamin deficiency E53.8     Epilepsy with partial complex seizures (H) G40.209     Obesity E66.9     Tobacco user Z72.0     Vitamin D deficiency E55.9         PAST MEDICAL & SURGICAL HISTORY     Past Medical History:   Patient  has no past medical history on file.    Surgical History:  He  has no past surgical history on file.     SOCIAL HISTORY     Reviewed, and he  reports that he has quit smoking. He has never used smokeless tobacco. He reports previous alcohol use.     FAMILY HISTORY     Reviewed, and family history includes Diabetes in his mother; Hypertension in his mother; Seizure Disorder in his mother.     ALLERGIES     Allergies   Allergen Reactions     Aripiprazole      Other reaction(s): Agitation  Suicidal and terroristic thoughts     Acetaminophen Other (See Comments)     Seizure       Amoxicillin      Aspirin      Carbamazepine      Codeine Other (See Comments)     seizure     Divalproex Sodium [Valproic Acid] Other (See Comments)     Pancreatitis       Iodine Other (See Comments)     Seizure       Morphine      Diatrizoate Rash     Phenytoin Rash     Topamax [Topiramate] Other (See Comments)     Hallucinations           REVIEW OF SYSTEMS     A 12 point review of system was performed and was negative except as outlined in the history of present illness.     HOME MEDICATIONS       Current Outpatient Medications:      Cyanocobalamin (VITAMIN B-12 PO), Take 1 tablet by mouth 2 times daily, Disp: , Rfl:      levETIRAcetam (KEPPRA) 750 MG tablet, Take 2 tablets (1,500 mg) by mouth 2 times daily, Disp: 60 tablet, Rfl: 11     losartan (COZAAR) 25 MG tablet, Take 25 mg by mouth 2 times daily, Disp: , Rfl:      omeprazole (PRILOSEC) 20 MG DR capsule, Take 20 mg by mouth daily, Disp: , Rfl:      risperiDONE (RISPERDAL) 2 MG tablet, Take 2 mg by mouth daily before breakfast, Disp: , Rfl:      vitamin D2  (ERGOCALCIFEROL) 19426 units (1250 mcg) capsule, Take 50,000 Units by mouth once a week On Monday, Disp: , Rfl:      calcium carbonate (TUMS) 500 MG chewable tablet, Take 1-2 tablets by mouth daily as needed, Disp: , Rfl:       PHYSICAL EXAM     Vital signs  Ht 1.829 m (6')   Wt 89.4 kg (197 lb)   BMI 26.72 kg/m      Weight:   197 lbs 0 oz    Patient is alert and oriented x3 speech normal with no dysarthria or aphasia.  Hearing is normal he can tell me his name date and place.  His hearing appeared normal.  Rest of the exam was not possible due to telephone visit     DIAGNOSTIC STUDIES     PERTINENT RADIOLOGY  Following imaging studies were reviewed:     MRI 9/13/18  1. No acute/subacute infarcts, mass lesions, hydrocephalus or MRI evidence of intracranial hemorrhage. No abnormal intracranial enhancement.  2. Intracranial contents are stable compared to brain MRI 12/26/2014.    EEG 9/13/18  Impression: This is a normal awake and drowsy EEG     PERTINENT LABS  Following labs were reviewed:  Admission on 10/17/2020, Discharged on 10/17/2020   Component Date Value     WBC 10/17/2020 8.2      RBC Count 10/17/2020 4.50      Hemoglobin 10/17/2020 13.6      Hematocrit 10/17/2020 41.3      MCV 10/17/2020 92      MCH 10/17/2020 30.2      MCHC 10/17/2020 32.9      RDW 10/17/2020 12.6      Platelet Count 10/17/2020 297      Diff Method 10/17/2020 Automated Method      % Neutrophils 10/17/2020 73.8      % Lymphocytes 10/17/2020 15.8      % Monocytes 10/17/2020 8.0      % Eosinophils 10/17/2020 1.6      % Basophils 10/17/2020 0.6      % Immature Granulocytes 10/17/2020 0.2      Nucleated RBCs 10/17/2020 0      Absolute Neutrophil 10/17/2020 6.0      Absolute Lymphocytes 10/17/2020 1.3      Absolute Monocytes 10/17/2020 0.7      Absolute Eosinophils 10/17/2020 0.1      Absolute Basophils 10/17/2020 0.1      Abs Immature Granulocytes 10/17/2020 0.0      Absolute Nucleated RBC 10/17/2020 0.0      D Dimer 10/17/2020 <0.3       "Sodium 10/17/2020 139      Potassium 10/17/2020 3.9      Chloride 10/17/2020 103      Carbon Dioxide 10/17/2020 32      Anion Gap 10/17/2020 4      Glucose 10/17/2020 83      Urea Nitrogen 10/17/2020 16      Creatinine 10/17/2020 0.98      GFR Estimate 10/17/2020 >90      GFR Estimate If Black 10/17/2020 >90      Calcium 10/17/2020 9.5      Troponin I ES 10/17/2020 <0.015      COVID-19 Virus PCR to U * 10/17/2020 Nasopharyngeal      COVID-19 Virus PCR to U * 10/17/2020 Not Detected       Component      Latest Ref Rng & Units 11/19/2020   Cholesterol      <=199 mg/dL 181   Triglycerides      <=149 mg/dL 249 (H)   HDL Cholesterol      >=40 mg/dL 42   LDL Calculated      <=129 mg/dL 89   Patient Fasting > 8hrs?       Unknown   Vitamin D, Total (25-Hydroxy)      30.0 - 80.0 ng/mL 53.2     TELEPHONE VISIT CONSENT   This telephone visit was done in lieu of a face to face visit due to COVID 19 pandemic. The patient has been notified of following:    \"This telephone visit will be conducted via a call between you and your physician/provider. We have found that certain health care needs can be provided without the need for a physical exam. This service lets us provide the care you need with a short phone conversation. If a prescription is necessary we can send it directly to your pharmacy. If lab work is needed we can place an order for that and you can then stop by our lab to have the test done at a later time. If during the course of the call the physician/provider feels a telephone visit is not appropriate, you will not be charged for this service.\"    Patient has given verbal consent for Telephone visit? YES  Consent has been obtained for this service by 1 care team member: YES    Total Time: visit 30 minutes     Total time spent for face to face visit, reviewing labs/imaging studies, counseling and coordination of care was: 30 Minutes More than 50% of this time was spent on counseling and coordination of care.      This " note was dictated using voice recognition software.  Any grammatical or context distortions are unintentional and inherent to the software.             Again, thank you for allowing me to participate in the care of your patient.        Sincerely,        Hang Covarrubias MD

## 2020-11-23 NOTE — TELEPHONE ENCOUNTER
Moni from Joel's group home needs a seizure protocol for him. If this something you can dictate so I can send it to them? Thanks  Rita Lopes CMA on 11/23/2020 at 1:58 PM

## 2020-11-23 NOTE — NURSING NOTE
Chief Complaint   Patient presents with     Seizures     Annual follow up- Patient states he is having increased seizures and also more confusion      Phone visit     Call [phone]     Rita Lopes CMA on 11/23/2020 at 12:18 PM

## 2020-11-26 ENCOUNTER — NURSE TRIAGE (OUTPATIENT)
Dept: NURSING | Facility: CLINIC | Age: 45
End: 2020-11-26

## 2020-11-26 NOTE — TELEPHONE ENCOUNTER
Patient is in group Home. They are calling to report he has an elevated HR.   No number relayed,  Nl.. lower 90's amd higher 80's. O2 is  98%, /80.    HX of seizures,  And patient asking care coordinator to call nurse, to see what we recommend    Patient was advised to do some deep breathing , and relaxation. Technique.  Mostly deep breathing , and legs elevated .    If seizure starts, to call 911 for assist.  CC expressed understanding.    Molly Casey RN on 11/26/2020 at 11:05 AM    Additional Information    Problems with anxiety or stress    Negative: History of hyperthyroidism or taking thyroid medication    Negative: Known or suspected substance abuse (e.g., cocaine, alcohol abuse)    Negative: Palpitations and no improvement after following Care Advice    Protocols used: HEART RATE AND HEARTBEAT [id number]-X-ER

## 2021-01-31 ENCOUNTER — HOSPITAL ENCOUNTER (EMERGENCY)
Facility: CLINIC | Age: 46
Discharge: HOME | End: 2021-01-31
Attending: PHYSICIAN ASSISTANT | Admitting: PHYSICIAN ASSISTANT
Payer: COMMERCIAL

## 2021-01-31 VITALS
SYSTOLIC BLOOD PRESSURE: 154 MMHG | TEMPERATURE: 97 F | DIASTOLIC BLOOD PRESSURE: 90 MMHG | HEART RATE: 115 BPM | RESPIRATION RATE: 18 BRPM | BODY MASS INDEX: 26.72 KG/M2 | WEIGHT: 197 LBS | OXYGEN SATURATION: 97 %

## 2021-01-31 DIAGNOSIS — S91.114A LACERATION OF LESSER TOE OF RIGHT FOOT WITHOUT FOREIGN BODY PRESENT OR DAMAGE TO NAIL, INITIAL ENCOUNTER: ICD-10-CM

## 2021-01-31 PROCEDURE — 99212 OFFICE O/P EST SF 10 MIN: CPT | Performed by: PHYSICIAN ASSISTANT

## 2021-01-31 PROCEDURE — G0463 HOSPITAL OUTPT CLINIC VISIT: HCPCS | Performed by: PHYSICIAN ASSISTANT

## 2021-01-31 NOTE — ED PROVIDER NOTES
History     Chief Complaint   Patient presents with     Laceration     cut pinky toe while clipping his toenails     HPI  Joel Gray is a 45 year old male who presents to the urgent care accompanied by his caregiver with concerns of a laceration to the distal tip of his right small toe.  Patient was attempting to trim his nails when he cut too deeply resulting in laceration to the skin.  He denies any significant pain in the area.  No concern for foreign body sensation.  His tetanus vaccine is up-to-date.    Allergies:  Allergies   Allergen Reactions     Aripiprazole      Other reaction(s): Agitation  Suicidal and terroristic thoughts     Acetaminophen Other (See Comments)     Seizure       Amoxicillin      Aspirin      Carbamazepine      Codeine Other (See Comments)     seizure     Divalproex Sodium [Valproic Acid] Other (See Comments)     Pancreatitis       Iodine Other (See Comments)     Seizure       Morphine      Diatrizoate Rash     Phenytoin Rash     Topamax [Topiramate] Other (See Comments)     Hallucinations       Problem List:    Patient Active Problem List    Diagnosis Date Noted     Cobalamin deficiency 11/23/2020     Priority: Medium     Epilepsy with partial complex seizures (H) 11/23/2020     Priority: Medium     Obesity 11/23/2020     Priority: Medium     Tobacco user 11/23/2020     Priority: Medium     Vitamin D deficiency 11/23/2020     Priority: Medium     Pseudoseizures 09/13/2018     Priority: Medium     Borderline intellectual functioning 01/10/2017     Priority: Medium     Intermittent explosive disorder 09/05/2015     Priority: Medium     Psychosis (H) 09/04/2015     Priority: Medium     Hypertension 08/30/2009     Priority: Medium     Updated by system to replace inactive record  Updated by system to replace inactive record       Dysphonia 01/30/2009     Priority: Medium     Mixed anxiety depressive disorder 04/20/2006     Priority: Medium     Gastroesophageal reflux disease  04/20/2006     Priority: Medium        Past Medical History:    No past medical history on file.    Past Surgical History:    No past surgical history on file.    Family History:    Family History   Problem Relation Age of Onset     Diabetes Mother      Seizure Disorder Mother      Hypertension Mother      Social History:  Marital Status:  Single [1]  Social History     Tobacco Use     Smoking status: Former Smoker     Smokeless tobacco: Never Used   Substance Use Topics     Alcohol use: Not Currently     Drug use: Not on file      Medications:         calcium carbonate (TUMS) 500 MG chewable tablet       Cyanocobalamin (VITAMIN B-12 PO)       levETIRAcetam (KEPPRA) 750 MG tablet       losartan (COZAAR) 25 MG tablet       omeprazole (PRILOSEC) 20 MG DR capsule       risperiDONE (RISPERDAL) 2 MG tablet       vitamin D2 (ERGOCALCIFEROL) 14134 units (1250 mcg) capsule      Review of Systems  INTEGUMENTARY/SKIN: POSITIVE for laceration to right small toe   MUSCULOSKELETAL: NEGATIVE for significant arthralgias or myalgia  NEURO: NEGATIVE for numbness or paresthesias  Physical Exam   BP: (!) 154/90  Pulse: 115  Temp: 97  F (36.1  C)  Resp: 18  Weight: 89.4 kg (197 lb)  SpO2: 97 %  Physical Exam  Constitutional:       General: He is not in acute distress.     Appearance: He is not ill-appearing or toxic-appearing.   HENT:      Head: Normocephalic and atraumatic.   Musculoskeletal:      Right ankle: Normal.      Right foot: Normal range of motion and normal capillary refill. Laceration (4 mm superficial skin avulsion to distal tip of right small toe, no active bleeding at this time) present. No tenderness, bony tenderness, swelling, crepitus or deformity.   Skin:     Findings: Laceration (skin avulsion) present. No abrasion, ecchymosis or erythema.   Neurological:      Mental Status: He is alert.       ED Course        Procedures        Critical Care time:  none          No results found for this or any previous visit (from  the past 24 hour(s)).    Medications - No data to display    Assessments & Plan (with Medical Decision Making)     I have reviewed the nursing notes.    I have reviewed the findings, diagnosis, plan and need for follow up with the patient.       Discharge Medication List as of 1/31/2021  2:15 PM        Final diagnoses:   Laceration of lesser toe of right foot without foreign body present or damage to nail, initial encounter     45-year-old male presents to the urgent care with concern of a laceration to his left small toe after he cut it while attempting to trim his fingernails earlier today.  Physical exam findings significant for small superficial skin avulsion to distal left toe, no active bleeding at this time.  After discussing her/benefits of primary versus secondary closure with patient and caregiver they elected to allow wound ER by secondary intent.  Wound care was dressed by RN prior to discharge.  Wound care instructions instructions, signs of infection, worrisome reasons to return to ER/UC discussed.     Disclaimer: This note consists of symbols derived from keyboarding, dictation, and/or voice recognition software. As a result, there may be errors in the script that have gone undetected.  Please consider this when interpreting information found in the chart.    1/31/2021   [facility] EMERGENCY DEPT     Patt Aguila PA-C  01/31/21 [id number]

## 2021-05-30 ENCOUNTER — RECORDS - HEALTHEAST (OUTPATIENT)
Dept: ADMINISTRATIVE | Facility: CLINIC | Age: 46
End: 2021-05-30

## 2021-05-30 VITALS — BODY MASS INDEX: 27.54 KG/M2 | HEIGHT: 72 IN | WEIGHT: 203.3 LBS

## 2021-06-02 ENCOUNTER — RECORDS - HEALTHEAST (OUTPATIENT)
Dept: ADMINISTRATIVE | Facility: CLINIC | Age: 46
End: 2021-06-02

## 2021-06-03 ENCOUNTER — RECORDS - HEALTHEAST (OUTPATIENT)
Dept: ADMINISTRATIVE | Facility: CLINIC | Age: 46
End: 2021-06-03

## 2021-06-08 NOTE — PROGRESS NOTES
PSYCHIATRY CLINIC VISIT NOTE        DIAGNOSIS:   Principal Problem:    1. Intermittent explosive disorder       PLAN:   1. Ongoing education given regarding diagnostic and treatment options with adequate verbalization of understanding.  2. Restart Abilify Maintena 400 mg IM monthly.  We will also need to start the oral supplement for the next 14 days Abilify 10 mg daily was sent to the pharmacy as well.  3. Crisis planning in place.  4.   will work with the nursing staff through [facility] to get the injection restarted ASAP.  They will go to the patient's house directly and given a shot monthly.  5.  Discuss importance of taking seizure medications as prescribed.  Instructed patient and the  to contact his neurologist to notify a provider that he is not taking his medication as prescribed.  6. Psychotherapy provided in-session.  7. Medication side effects/warning signs reviewed.  8. Return in about 10 weeks (around 3/21/2017) for Recheck.    Risk Assessment: Patient able to contract for safety           DATE OF SERVICE:   1/10/2017         REASON FOR VISIT:     Chief Complaint   Patient presents with     Mental Health Problem     follow up             HPI:    This is a 41 y.o. male with history of intermittent explosive disorder, cognitive and developmentally delayed, seizures.. Patient was hospitalized at [facility] from September 4, 2015 through October 28, 2015.  Patient was discharged on Abilify Maintena 400 mg monthly.  Patient was placed on civil commitment that will extend through March 2017 when it will be reevaluated.     Last visit 8/1/16.  Recommendation at last visit was to continue on Abilify Maintena of 400 mg IM monthly.  That time he was receiving the injections from public health nurse through [facility]    Today, patient presents with Abril, his  who also works with [facility].   states that patient did not want to have the public health nurse give him  the shots any longer and therefore she assumed he was getting it through a provider in [place] however this was never verified with this provider or the [place] provider.  Patient states that he was seen a provider at to the ER.  He continued with this story through most of her visits when he then eventually admitted that he has not had his injections since September.    We did review his past records.  Patient was hospitalized at [facility] in 2012 with suicidal behaviors.  In 2015,  patient was very aggressive prior and during hospitalization.  Neuropsych testing was completed by Dr. Deshpande and reviewed.  Diagnosed with intermittent explosive disorder and borderline intellectual functioning.  See hospital records for complete details.    Patient denies any depression.  Denies aggressive behavior however this is only per self-report.  He does live with his parents.   is not aware of any violent behavior.  States he is sleeping well.  Denies any side effects with the Abilify injection but states he just does not like to receive the injections.  He does however endorse missing his seizure medications.  States he is not always taking these regularly.      SHx:   Occupation: none  Marital Status: single  Children: 6 children  Living Situation: Living arrangements - the patient is living with his parents and 3 of his 6 children         MEDICATIONS:     Current Outpatient Prescriptions   Medication Sig Dispense Refill     ARIPiprazole (ABILIFY MAINTENA) 400 mg monthly injection Inject 400 mg into the shoulder, thigh, or buttocks every 30 (thirty) days. 1 each 2     calcium carbonate (OS-CARLY) 500 mg calcium (1,250 mg) chewable tablet CHEW 1-2 TABS BY MOUTH TID AS NEEDED. 90 tablet 0     famotidine (PEPCID) 20 MG tablet Take 1 tablet (20 mg total) by mouth 2 (two) times a day as needed for heartburn. 30 tablet 0     levETIRAcetam (KEPPRA) 750 MG tablet Take 1,500 mg by mouth 2 (two) times a day.       omeprazole  (PRILOSEC) 20 MG capsule Take 20 mg by mouth daily.       No current facility-administered medications for this visit.      [facility] website reviewed re:controlled substance use       ROS:   All systems negative except as mentioned above in HPI.  Patient denies any recent seizure activity.  He states he did see Dr. Covarrubias his neurologist in the last several months.  States another medication was added however he never started this.          MENTAL STATUS EXAM:   Vitals:   Visit Vitals     /82 (Patient Site: Right Arm, Patient Position: Sitting, Cuff Size: Adult Regular)     Pulse 76     Ht 6' (1.829 m)     Wt 203 lb 4.8 oz (92.2 kg)     SpO2 98%     BMI 27.57 kg/m2       Appearance:  Clean/neat  Mood:  euthymic  Affect: mood congruent  Suicidal Ideation: absent  Homicidal Ideation: absent  Thought process: concrete  Thought content: Normal  Fund of Knowledge: limited  Attention/Concentration: intact  Language ability: intact  Memory: vague historian  Insight and Judgement: limited  Orientation: person, place, time and situation  Psychomotor Behavior: normal  Muscle Strength and Tone: normal  Gait and Station: normal gait and station         PHYSICAL EXAM:   General appearance - alert, well appearing, and in no distress  Mental status - alert, oriented to person, place, and time  Neurological - alert, oriented, normal speech, no focal findings or movement disorder noted         LABS:   Personally reviewed.     Total time  30 minutes with > 50% spent on coordination of cares and psycho-education.      Michelle Horn, CNP

## 2021-06-18 ENCOUNTER — TELEPHONE (OUTPATIENT)
Dept: NEUROLOGY | Facility: CLINIC | Age: 46
End: 2021-06-18

## 2021-06-18 NOTE — TELEPHONE ENCOUNTER
[facility] called to request a order for a home care assessment, along with Dx, med list, and last visit note. Fax [phone]  [phone]

## 2021-06-21 NOTE — TELEPHONE ENCOUNTER
See message below- if this something you can order now? He has an appt with you July 12 and hasn't been seen in person November 2019.   Rita Lopes CMA on 6/21/2021 at 8:19 AM

## 2021-06-21 NOTE — TELEPHONE ENCOUNTER
Home care assessment order should be obtained from his primary care physician.  I can provide my last visit note once I evaluate him in July

## 2021-07-12 ENCOUNTER — LAB (OUTPATIENT)
Dept: LAB | Facility: HOSPITAL | Age: 46
End: 2021-07-12
Payer: MEDICARE

## 2021-07-12 ENCOUNTER — OFFICE VISIT (OUTPATIENT)
Dept: NEUROLOGY | Facility: CLINIC | Age: 46
End: 2021-07-12
Payer: MEDICARE

## 2021-07-12 VITALS
HEART RATE: 90 BPM | BODY MASS INDEX: 31.1 KG/M2 | SYSTOLIC BLOOD PRESSURE: 139 MMHG | HEIGHT: 69 IN | DIASTOLIC BLOOD PRESSURE: 85 MMHG | WEIGHT: 210 LBS

## 2021-07-12 DIAGNOSIS — G40.209 PARTIAL SYMPTOMATIC EPILEPSY WITH COMPLEX PARTIAL SEIZURES, NOT INTRACTABLE, WITHOUT STATUS EPILEPTICUS (H): Primary | ICD-10-CM

## 2021-07-12 DIAGNOSIS — F44.5 PSEUDOSEIZURES: ICD-10-CM

## 2021-07-12 DIAGNOSIS — G40.209 PARTIAL SYMPTOMATIC EPILEPSY WITH COMPLEX PARTIAL SEIZURES, NOT INTRACTABLE, WITHOUT STATUS EPILEPTICUS (H): ICD-10-CM

## 2021-07-12 PROBLEM — R45.851 SUICIDAL IDEATION: Status: ACTIVE | Noted: 2020-12-26

## 2021-07-12 LAB
ANION GAP SERPL CALCULATED.3IONS-SCNC: 8 MMOL/L (ref 5–18)
CHLORIDE BLD-SCNC: 105 MMOL/L (ref 98–107)
CO2 SERPL-SCNC: 30 MMOL/L (ref 22–31)
LEVETIRACETAM (KEPPRA): 31.8 UG/ML (ref 6–46)
POTASSIUM BLD-SCNC: 3.5 MMOL/L (ref 3.5–5)
SODIUM SERPL-SCNC: 143 MMOL/L (ref 136–145)

## 2021-07-12 PROCEDURE — 82374 ASSAY BLOOD CARBON DIOXIDE: CPT

## 2021-07-12 PROCEDURE — 99214 OFFICE O/P EST MOD 30 MIN: CPT | Performed by: PSYCHIATRY & NEUROLOGY

## 2021-07-12 PROCEDURE — 80177 DRUG SCRN QUAN LEVETIRACETAM: CPT

## 2021-07-12 PROCEDURE — 36415 COLL VENOUS BLD VENIPUNCTURE: CPT

## 2021-07-12 RX ORDER — LEVETIRACETAM 750 MG/1
1500 TABLET ORAL 2 TIMES DAILY
Qty: 360 TABLET | Refills: 3 | Status: SHIPPED | OUTPATIENT
Start: 2021-07-12 | End: 2022-08-01

## 2021-07-12 ASSESSMENT — MIFFLIN-ST. JEOR: SCORE: 1817.93

## 2021-07-12 NOTE — PROGRESS NOTES
NEUROLOGY FOLLOW UP VISIT  NOTE       [facility]UR[facility] [place]  [address] [place] [address]  Tel: [phone]  Fax: [phone]  [email or web address]     Joel Gray,  1975, MRN [id number]  PCP: Joan Fletcher  Date: 2021      ASSESSMENT & PLAN     Diagnosis code  1.  Partial symptomatic epilepsy with complex partial seizures, not intractable, without status epilepticus (H)  2. Pseudoseizures     Complex partial seizures and pseudoseizures  46-year-old male with history of pseudoseizures and complex partial seizures who returns for yearly follow-up.  He still experiences nocturnal seizures at least once every 2 months but his description does not sound very convincing.  In the past he was admitted to [place] [facility] but no abnormality was found on long-term EEG monitoring.  Ever since he was started on Keppra he claims his symptoms have improved.  Prescription for Keppra was refilled.  I am checking Keppra level and electrolyte panel.  Follow-up will be in 1 year.    Thank you again for this referral, please feel free to contact me if you have any questions.    Hang Covarrubias MD  [facility]  ([facility], [facility]     HISTORY OF PRESENT ILLNESS     Patient is a 46-year-old male with history of complex partial seizure, pseudoseizure, depression with anxiety, HTN, vitamin B12 deficiency who returns for yearly follow-up.  Since his last visit he reports ongoing seizure activity that he claims only happens when he is sleeping.  He is living independently and is very happy that he has his own apartment.  He is not working and is on disability due to his seizures..    Patient started having seizures in  and according to his girlfriend he would have shaking at night that sounded more like shivering rather than a seizure.  He was admitted to [place] [facility] and had 3 days of monitoring and  although EEG was normal he was started on Depakote for mood stabilization.  Since that admission he has been seen in the emergency room multiple times for seizure that were felt to be due to pseudoseizure.  While being on Depakote he developed pancreatitis and was taken off Depakote and switched to Keppra.  Initially he had difficulty tolerating Keppra but afterwards adjusted to the dose.  He was also evaluated by neuropsychology and was felt to have impaired range of overall intellectual ability and is being followed by psychological services for depression and impulse control disorder.     PROBLEM LIST   Patient Active Problem List   Diagnosis Code     Borderline intellectual functioning R41.83     Mixed anxiety depressive disorder F41.8     Dysphonia R49.0     Gastroesophageal reflux disease K21.9     Hypertension I10     Intermittent explosive disorder F63.81     Pseudoseizures F44.5     Psychosis (H) F29     Cobalamin deficiency E53.8     Epilepsy with partial complex seizures (H) G40.209     Obesity E66.9     Tobacco user Z72.0     Vitamin D deficiency E55.9     Suicidal ideation R45.851         PAST MEDICAL & SURGICAL HISTORY     Past Medical History:   Patient  has no past medical history on file.    Surgical History:  He  has a past surgical history that includes other surgical history; Cholecystectomy; and hernia repair.     SOCIAL HISTORY     Reviewed, and he  reports that he has quit smoking. He has never used smokeless tobacco. He reports previous alcohol use.     FAMILY HISTORY     Reviewed, and family history includes Diabetes in his mother; Hypertension in his mother; Seizure Disorder in his mother. He was adopted.     ALLERGIES     Allergies   Allergen Reactions     Aripiprazole      Other reaction(s): Agitation  Suicidal and terroristic thoughts     Acetaminophen Other (See Comments)     Seizure       Amoxicillin      Aspirin      Carbamazepine      Codeine Other (See Comments)     seizure      "Divalproex Sodium [Valproic Acid] Other (See Comments)     Pancreatitis       Iodine Other (See Comments)     Seizure       Morphine      Diatrizoate Rash     Phenytoin Rash     Topamax [Topiramate] Other (See Comments)     Hallucinations           REVIEW OF SYSTEMS     A 12 point review of system was performed and was negative except as outlined in the history of present illness.     HOME MEDICATIONS     Current Outpatient Rx   Medication Sig Dispense Refill     Cyanocobalamin (VITAMIN B-12 PO) Take 1 tablet by mouth 2 times daily       levETIRAcetam (KEPPRA) 750 MG tablet Take 2 tablets (1,500 mg) by mouth 2 times daily 360 tablet 3     losartan (COZAAR) 25 MG tablet Take 25 mg by mouth 2 times daily       omeprazole (PRILOSEC) 20 MG DR capsule Take 20 mg by mouth daily       risperiDONE (RISPERDAL) 2 MG tablet Take 2 mg by mouth daily before breakfast       vitamin D2 (ERGOCALCIFEROL) 86000 units (1250 mcg) capsule Take 50,000 Units by mouth once a week On Monday       calcium carbonate (TUMS) 500 MG chewable tablet Take 1-2 tablets by mouth daily as needed           PHYSICAL EXAM     Vital signs  /85 (BP Location: Right arm, Patient Position: Sitting)   Pulse 90   Ht 1.753 m (5' 9\")   Wt 95.3 kg (210 lb)   BMI 31.01 kg/m      Weight:   210 lbs 0 oz    Patient is alert and oriented x4 in no acute distress. Vital signs were reviewed and are documented in electronic medical record. Neck was supple, no carotid bruits, thyromegaly, JVD, or lymphadenopathy was noted.  NEUROLOGY EXAM:    Patient s speech sounds deliberate with difficulty expressing himself but at times he is able to talk normally    Funduscopic exam was normal, with normal cup to disc ratio. Cranial nerves II -XII were intact.    Patient had normal mass, tone and motor strength was 5/5 in all extremities without pronator drift.    Sensation was intact to light touch, pinprick, and vibratory sensation.    Reflexes were symmetrical with " downgoing toes.    No dysmetria noted on FNF or HKS.    Gait testing was normal. Romberg was negative.     DIAGNOSTIC STUDIES     PERTINENT RADIOLOGY  Following imaging studies were reviewed:     MRI 9/13/18  1. No acute/subacute infarcts, mass lesions, hydrocephalus or MRI evidence of intracranial hemorrhage. No abnormal intracranial enhancement.  2. Intracranial contents are stable compared to brain MRI 12/26/2014.    EEG 9/13/18  Impression: This is a normal awake and drowsy EEG     PERTINENT LABS  Following labs were reviewed:  No visits with results within 3 Month(s) from this visit.   Latest known visit with results is:   Records - [facility] on 11/19/2020   Component Date Value     Vitamin D, Total (25-Hyd* 11/19/2020 53.2      Cholesterol 11/19/2020 181      Triglycerides 11/19/2020 249*     Direct Measure HDL 11/19/2020 42      LDL Cholesterol Calculat* 11/19/2020 89      Patient Fasting > 8hrs? 11/19/2020 Unknown          Total time spent for face to face visit, reviewing labs/imaging studies, counseling and coordination of care was: 30 Minutes spent on the date of the encounter doing chart review, review of outside records, review of test results, interpretation of tests, patient visit and documentation       This note was dictated using voice recognition software.  Any grammatical or context distortions are unintentional and inherent to the software.    Orders Placed This Encounter   Procedures     Keppra (Levetiracetam) Level     Electrolyte panel      New Prescriptions    No medications on file     Modified Medications    Modified Medication Previous Medication    LEVETIRACETAM (KEPPRA) 750 MG TABLET levETIRAcetam (KEPPRA) 750 MG tablet       Take 2 tablets (1,500 mg) by mouth 2 times daily    Take 2 tablets (1,500 mg) by mouth 2 times daily

## 2021-07-12 NOTE — NURSING NOTE
Chief Complaint   Patient presents with     Seizures     Annual follow up- states he is having seizures at night      Rita Lopes CMA on 7/12/2021 at 12:14 PM

## 2021-07-12 NOTE — LETTER
2021         RE: Joel Gray  [address]  [facility] [place] [facility] [address]        Dear Colleague,    Thank you for referring your patient, Joel Gray, to the [facility] [place]. Please see a copy of my visit note below.    NEUROLOGY FOLLOW UP VISIT  NOTE       [facility] NEUROLOG[facility] [place]  [address] [place] [address]  Tel: [phone]  Fax: [phone]  [email or web address]     Joel Gray,  1975, MRN [id number]  PCP: Joan Fletcher  Date: 2021      ASSESSMENT & PLAN     Diagnosis code  1.  Partial symptomatic epilepsy with complex partial seizures, not intractable, without status epilepticus (H)  2. Pseudoseizures     Complex partial seizures and pseudoseizures  46-year-old male with history of pseudoseizures and complex partial seizures who returns for yearly follow-up.  He still experiences nocturnal seizures at least once every 2 months but his description does not sound very convincing.  In the past he was admitted to [place] [facility] but no abnormality was found on long-term EEG monitoring.  Ever since he was started on Keppra he claims his symptoms have improved.  Prescription for Keppra was refilled.  I am checking Keppra level and electrolyte panel.  Follow-up will be in 1 year.    Thank you again for this referral, please feel free to contact me if you have any questions.    Hang Covarrubias MD  [facility]  ([facility], [facility]     HISTORY OF PRESENT ILLNESS     Patient is a 46-year-old male with history of complex partial seizure, pseudoseizure, depression with anxiety, HTN, vitamin B12 deficiency who returns for yearly follow-up.  Since his last visit he reports ongoing seizure activity that he claims only happens when he is sleeping.  He is living independently and is very happy that he has his own apartment.  He is not working and is on  disability due to his seizures..    Patient started having seizures in 1997 and according to his girlfriend he would have shaking at night that sounded more like shivering rather than a seizure.  He was admitted to [place] [facility] and had 3 days of monitoring and although EEG was normal he was started on Depakote for mood stabilization.  Since that admission he has been seen in the emergency room multiple times for seizure that were felt to be due to pseudoseizure.  While being on Depakote he developed pancreatitis and was taken off Depakote and switched to Keppra.  Initially he had difficulty tolerating Keppra but afterwards adjusted to the dose.  He was also evaluated by neuropsychology and was felt to have impaired range of overall intellectual ability and is being followed by psychological services for depression and impulse control disorder.     PROBLEM LIST   Patient Active Problem List   Diagnosis Code     Borderline intellectual functioning R41.83     Mixed anxiety depressive disorder F41.8     Dysphonia R49.0     Gastroesophageal reflux disease K21.9     Hypertension I10     Intermittent explosive disorder F63.81     Pseudoseizures F44.5     Psychosis (H) F29     Cobalamin deficiency E53.8     Epilepsy with partial complex seizures (H) G40.209     Obesity E66.9     Tobacco user Z72.0     Vitamin D deficiency E55.9     Suicidal ideation R45.851         PAST MEDICAL & SURGICAL HISTORY     Past Medical History:   Patient  has no past medical history on file.    Surgical History:  He  has a past surgical history that includes other surgical history; Cholecystectomy; and hernia repair.     SOCIAL HISTORY     Reviewed, and he  reports that he has quit smoking. He has never used smokeless tobacco. He reports previous alcohol use.     FAMILY HISTORY     Reviewed, and family history includes Diabetes in his mother; Hypertension in his mother; Seizure Disorder in his mother. He was adopted.     ALLERGIES  "    Allergies   Allergen Reactions     Aripiprazole      Other reaction(s): Agitation  Suicidal and terroristic thoughts     Acetaminophen Other (See Comments)     Seizure       Amoxicillin      Aspirin      Carbamazepine      Codeine Other (See Comments)     seizure     Divalproex Sodium [Valproic Acid] Other (See Comments)     Pancreatitis       Iodine Other (See Comments)     Seizure       Morphine      Diatrizoate Rash     Phenytoin Rash     Topamax [Topiramate] Other (See Comments)     Hallucinations           REVIEW OF SYSTEMS     A 12 point review of system was performed and was negative except as outlined in the history of present illness.     HOME MEDICATIONS     Current Outpatient Rx   Medication Sig Dispense Refill     Cyanocobalamin (VITAMIN B-12 PO) Take 1 tablet by mouth 2 times daily       levETIRAcetam (KEPPRA) 750 MG tablet Take 2 tablets (1,500 mg) by mouth 2 times daily 360 tablet 3     losartan (COZAAR) 25 MG tablet Take 25 mg by mouth 2 times daily       omeprazole (PRILOSEC) 20 MG DR capsule Take 20 mg by mouth daily       risperiDONE (RISPERDAL) 2 MG tablet Take 2 mg by mouth daily before breakfast       vitamin D2 (ERGOCALCIFEROL) 63716 units (1250 mcg) capsule Take 50,000 Units by mouth once a week On Monday       calcium carbonate (TUMS) 500 MG chewable tablet Take 1-2 tablets by mouth daily as needed           PHYSICAL EXAM     Vital signs  /85 (BP Location: Right arm, Patient Position: Sitting)   Pulse 90   Ht 1.753 m (5' 9\")   Wt 95.3 kg (210 lb)   BMI 31.01 kg/m      Weight:   210 lbs 0 oz    Patient is alert and oriented x4 in no acute distress. Vital signs were reviewed and are documented in electronic medical record. Neck was supple, no carotid bruits, thyromegaly, JVD, or lymphadenopathy was noted.  NEUROLOGY EXAM:    Patient s speech sounds deliberate with difficulty expressing himself but at times he is able to talk normally    Funduscopic exam was normal, with normal " cup to disc ratio. Cranial nerves II -XII were intact.    Patient had normal mass, tone and motor strength was 5/5 in all extremities without pronator drift.    Sensation was intact to light touch, pinprick, and vibratory sensation.    Reflexes were symmetrical with downgoing toes.    No dysmetria noted on FNF or HKS.    Gait testing was normal. Romberg was negative.     DIAGNOSTIC STUDIES     PERTINENT RADIOLOGY  Following imaging studies were reviewed:     MRI 9/13/18  1. No acute/subacute infarcts, mass lesions, hydrocephalus or MRI evidence of intracranial hemorrhage. No abnormal intracranial enhancement.  2. Intracranial contents are stable compared to brain MRI 12/26/2014.    EEG 9/13/18  Impression: This is a normal awake and drowsy EEG     PERTINENT LABS  Following labs were reviewed:  No visits with results within 3 Month(s) from this visit.   Latest known visit with results is:   Records - [facility] on 11/19/2020   Component Date Value     Vitamin D, Total (25-Hyd* 11/19/2020 53.2      Cholesterol 11/19/2020 181      Triglycerides 11/19/2020 249*     Direct Measure HDL 11/19/2020 42      LDL Cholesterol Calculat* 11/19/2020 89      Patient Fasting > 8hrs? 11/19/2020 Unknown          Total time spent for face to face visit, reviewing labs/imaging studies, counseling and coordination of care was: 30 Minutes spent on the date of the encounter doing chart review, review of outside records, review of test results, interpretation of tests, patient visit and documentation       This note was dictated using voice recognition software.  Any grammatical or context distortions are unintentional and inherent to the software.    Orders Placed This Encounter   Procedures     Keppra (Levetiracetam) Level     Electrolyte panel      New Prescriptions    No medications on file     Modified Medications    Modified Medication Previous Medication    LEVETIRACETAM (KEPPRA) 750 MG TABLET levETIRAcetam (KEPPRA) 750 MG tablet        Take 2 tablets (1,500 mg) by mouth 2 times daily    Take 2 tablets (1,500 mg) by mouth 2 times daily                     Again, thank you for allowing me to participate in the care of your patient.        Sincerely,        Hang Covarrubias MD

## 2021-09-09 ENCOUNTER — LAB REQUISITION (OUTPATIENT)
Dept: LAB | Facility: CLINIC | Age: 46
End: 2021-09-09
Payer: MEDICARE

## 2021-09-09 DIAGNOSIS — N52.9 MALE ERECTILE DYSFUNCTION, UNSPECIFIED: ICD-10-CM

## 2021-09-09 LAB
ALBUMIN SERPL-MCNC: 4 G/DL (ref 3.5–5)
ALP SERPL-CCNC: 102 U/L (ref 45–120)
ALT SERPL W P-5'-P-CCNC: 25 U/L (ref 0–45)
ANION GAP SERPL CALCULATED.3IONS-SCNC: 12 MMOL/L (ref 5–18)
AST SERPL W P-5'-P-CCNC: 23 U/L (ref 0–40)
BILIRUB SERPL-MCNC: 0.3 MG/DL (ref 0–1)
BUN SERPL-MCNC: 10 MG/DL (ref 8–22)
CALCIUM SERPL-MCNC: 9.4 MG/DL (ref 8.5–10.5)
CHLORIDE BLD-SCNC: 100 MMOL/L (ref 98–107)
CHOLEST SERPL-MCNC: 183 MG/DL
CO2 SERPL-SCNC: 29 MMOL/L (ref 22–31)
CREAT SERPL-MCNC: 0.84 MG/DL (ref 0.7–1.3)
ERYTHROCYTE [DISTWIDTH] IN BLOOD BY AUTOMATED COUNT: 12.9 % (ref 10–15)
GFR SERPL CREATININE-BSD FRML MDRD: >90 ML/MIN/1.73M2
GLUCOSE BLD-MCNC: 72 MG/DL (ref 70–125)
HCT VFR BLD AUTO: 41.5 % (ref 40–53)
HDLC SERPL-MCNC: 33 MG/DL
HGB BLD-MCNC: 13.5 G/DL (ref 13.3–17.7)
LDLC SERPL CALC-MCNC: 73 MG/DL
LDLC SERPL CALC-MCNC: ABNORMAL MG/DL
MCH RBC QN AUTO: 30 PG (ref 26.5–33)
MCHC RBC AUTO-ENTMCNC: 32.5 G/DL (ref 31.5–36.5)
MCV RBC AUTO: 92 FL (ref 78–100)
PLATELET # BLD AUTO: 311 10E3/UL (ref 150–450)
POTASSIUM BLD-SCNC: 3.6 MMOL/L (ref 3.5–5)
PROT SERPL-MCNC: 7.6 G/DL (ref 6–8)
RBC # BLD AUTO: 4.5 10E6/UL (ref 4.4–5.9)
SODIUM SERPL-SCNC: 141 MMOL/L (ref 136–145)
TRIGL SERPL-MCNC: 578 MG/DL
TSH SERPL DL<=0.005 MIU/L-ACNC: 0.52 UIU/ML (ref 0.3–5)
WBC # BLD AUTO: 7.1 10E3/UL (ref 4–11)

## 2021-09-09 PROCEDURE — 80053 COMPREHEN METABOLIC PANEL: CPT | Mod: ORL | Performed by: NURSE PRACTITIONER

## 2021-09-09 PROCEDURE — 85027 COMPLETE CBC AUTOMATED: CPT | Mod: ORL | Performed by: NURSE PRACTITIONER

## 2021-09-09 PROCEDURE — 84443 ASSAY THYROID STIM HORMONE: CPT | Mod: ORL | Performed by: NURSE PRACTITIONER

## 2021-09-09 PROCEDURE — 83721 ASSAY OF BLOOD LIPOPROTEIN: CPT | Mod: ORL | Performed by: NURSE PRACTITIONER

## 2021-09-09 PROCEDURE — 80061 LIPID PANEL: CPT | Mod: ORL | Performed by: NURSE PRACTITIONER

## 2021-09-09 PROCEDURE — 84403 ASSAY OF TOTAL TESTOSTERONE: CPT | Mod: ORL | Performed by: NURSE PRACTITIONER

## 2021-09-09 PROCEDURE — 84270 ASSAY OF SEX HORMONE GLOBUL: CPT | Mod: ORL | Performed by: NURSE PRACTITIONER

## 2021-09-10 LAB — SHBG SERPL-SCNC: 15 NMOL/L (ref 11–80)

## 2021-09-12 LAB
SHBG SERPL-SCNC: 15 NMOL/L (ref 11–80)
TESTOST FREE SERPL-MCNC: 9.85 NG/DL
TESTOST SERPL-MCNC: 341 NG/DL (ref 240–950)

## 2021-10-18 ENCOUNTER — LAB REQUISITION (OUTPATIENT)
Dept: LAB | Facility: CLINIC | Age: 46
End: 2021-10-18
Payer: MEDICARE

## 2021-10-18 DIAGNOSIS — E78.1 PURE HYPERGLYCERIDEMIA: ICD-10-CM

## 2021-10-18 LAB
FASTING STATUS PATIENT QL REPORTED: YES
TRIGL SERPL-MCNC: 564 MG/DL

## 2021-10-18 PROCEDURE — 84478 ASSAY OF TRIGLYCERIDES: CPT | Mod: ORL | Performed by: STUDENT IN AN ORGANIZED HEALTH CARE EDUCATION/TRAINING PROGRAM

## 2021-11-19 ENCOUNTER — OFFICE VISIT (OUTPATIENT)
Dept: NEUROLOGY | Facility: CLINIC | Age: 46
End: 2021-11-19
Payer: MEDICARE

## 2021-11-19 VITALS
HEIGHT: 69 IN | WEIGHT: 224 LBS | SYSTOLIC BLOOD PRESSURE: 165 MMHG | BODY MASS INDEX: 33.18 KG/M2 | DIASTOLIC BLOOD PRESSURE: 111 MMHG | HEART RATE: 90 BPM

## 2021-11-19 DIAGNOSIS — G40.209 PARTIAL SYMPTOMATIC EPILEPSY WITH COMPLEX PARTIAL SEIZURES, NOT INTRACTABLE, WITHOUT STATUS EPILEPTICUS (H): ICD-10-CM

## 2021-11-19 DIAGNOSIS — F44.5 PSEUDOSEIZURES: Primary | ICD-10-CM

## 2021-11-19 PROCEDURE — 99214 OFFICE O/P EST MOD 30 MIN: CPT | Performed by: PSYCHIATRY & NEUROLOGY

## 2021-11-19 RX ORDER — LORATADINE 10 MG/1
1 TABLET ORAL
COMMUNITY
Start: 2021-08-27 | End: 2021-12-25

## 2021-11-19 RX ORDER — LOSARTAN POTASSIUM 50 MG/1
50 TABLET ORAL 2 TIMES DAILY
COMMUNITY
Start: 2020-07-28

## 2021-11-19 ASSESSMENT — MIFFLIN-ST. JEOR: SCORE: 1881.44

## 2021-11-19 NOTE — LETTER
2021         RE: Joel Gray  [address]  [facility] [address]        Dear Colleague,    Thank you for referring your patient, Joel Gray, to the [facility] [place]. Please see a copy of my visit note below.    NEUROLOGY FOLLOW UP VISIT  NOTE       [facility] NEUROLOG[facility] [place]  [address] [place] [address]  Tel: [phone]  Fax: [phone]  [email or web address]     Joel Gray,  1975, MRN [id number]  PCP: Joan Fletcher  Date: 2021      ASSESSMENT & PLAN     Visit Diagnosis  1.  Pseudoseizures (H)  2. Partial symptomatic epilepsy with complex partial seizures, not intractable, without status epilepticus (H)     Complex partial seizures  46-year-old male with history of pseudoseizures and complex partial seizures who returns for follow-up sooner than his scheduled appointment.  He thinks he had a breakthrough seizure as he woke up in the morning and realized he bit his tongue and had generalized aching.  He denies missing any medication.  I have recommended:    1.  Continue Keppra 750 mg 2 tablets twice a day  2.  Check Keppra level and comprehensive metabolic panel  3.  EEG  4.  Follow-up in 3 months    Thank you again for this referral, please feel free to contact me if you have any questions.    Hang Covarrubias MD  [facility]  (Formerly, [facility]     HISTORY OF PRESENT ILLNESS     Patient is a 46-year-old male with history of complex partial seizures, pseudoseizures, depression with anxiety, HTN, vitamin B12 deficiency who was last seen on 2021 and is here earlier than his scheduled visit.  He reports recently had a breakthrough seizure.  He woke up in the morning and realized he bit his tongue and he had generalized aches.  He denies missing any medication, fever, chills or other symptoms that could have triggered his seizures.  It is  SUBJECTIVE: Patient seen and examined. Pain controlled.  No issues overnight. No HA, f/c/n/v/cp/sob. Wrist pain well controlled    Vital Signs Last 24 Hrs  T(C): 36.7 (25 Oct 2020 04:57), Max: 37.1 (24 Oct 2020 18:44)  T(F): 98 (25 Oct 2020 04:57), Max: 98.8 (24 Oct 2020 18:44)  HR: 88 (25 Oct 2020 04:02) (74 - 92)  BP: 129/77 (25 Oct 2020 04:02) (108/59 - 131/78)  BP(mean): 97 (25 Oct 2020 04:02) (78 - 99)  RR: 20 (25 Oct 2020 04:02) (18 - 20)  SpO2: 95% (25 Oct 2020 04:02) (95% - 97%)    Physical Exam:   General: Resting comfortably, NAD  LUE: Arm in splint. FRANTZ proximal to dressing, wound vac inside dressing. Dressing: clean/dry/intact. Fingers SILT. AIN/PIN/U motor intact. Fingers warm well perfused; capillary refill <3 seconds              Labs:             9.7    9.62  )-----------( 150      ( 19 Oct 2020 21:43 )             30.2     10-19    139  |  104  |  13  ----------------------------<  168<H>  4.4   |  24  |  0.89    Ca    8.8      19 Oct 2020 21:43  Phos  3.6     10-19  Mg     1.5     10-19        A/P :  Pt is a 68yo Male s/p bone graft harvest from L radius w/ plating for stability, stable, NVID   -    Pain control    -    Physical Therapy  -    Care per primary team  -    WBS: NWB LUE until 1 week postop  -    Will coordinate with ENT about splint takedown/change together at times difficult to interpret his symptoms due to his mental health issues and in the past there was a concern he has both pseudoseizures and real seizures    Patient started having seizures in 1997 and according to his girlfriend he would have shaking at night that sounded more like shivering rather than a seizure.  He was admitted to [place] [facility] and had 3 days of monitoring and although EEG was normal he was started on Depakote for mood stabilization.  Since that admission he has been seen in the emergency room multiple times for seizure that were felt to be due to pseudoseizure.  While being on Depakote he developed pancreatitis and was taken off Depakote and switched to Keppra.  Initially he had difficulty tolerating Keppra but afterwards adjusted to the dose.  He was also evaluated by neuropsychology and was felt to have impaired range of overall intellectual ability and is being followed by psychological services for depression and impulse control disorder.     PROBLEM LIST   Patient Active Problem List   Diagnosis Code     Borderline intellectual functioning R41.83     Mixed anxiety depressive disorder F41.8     Dysphonia R49.0     Gastroesophageal reflux disease K21.9     Hypertension I10     Intermittent explosive disorder F63.81     Pseudoseizures (H) R56.9     Psychosis (H) F29     Cobalamin deficiency E53.8     Epilepsy with partial complex seizures (H) G40.209     Obesity E66.9     Tobacco user Z72.0     Vitamin D deficiency E55.9     Suicidal ideation R45.851         PAST MEDICAL & SURGICAL HISTORY     Past Medical History:   Patient  has no past medical history on file.    Surgical History:  He  has a past surgical history that includes other surgical history; Cholecystectomy; and hernia repair.     SOCIAL HISTORY     Reviewed, and he  reports that he has quit smoking. He has never used smokeless tobacco. He reports previous alcohol use.     FAMILY HISTORY     Reviewed, and family  "history includes Diabetes in his mother; Hypertension in his mother; Seizure Disorder in his mother. He was adopted.     ALLERGIES     Allergies   Allergen Reactions     Aripiprazole      Other reaction(s): Agitation  Suicidal and terroristic thoughts     Acetaminophen Other (See Comments)     Seizure       Amoxicillin      Aspirin      Carbamazepine      Citalopram      Other reaction(s): Unknown     Codeine Other (See Comments)     seizure     Dexilant      Other reaction(s): Unknown     Divalproex Sodium [Valproic Acid] Other (See Comments)     Pancreatitis       Ibuprofen      Other reaction(s): rash     Iodine Other (See Comments)     Seizure       Morphine      Trazodone      Other reaction(s): Unknown     Diatrizoate Rash     Phenytoin Rash     Topamax [Topiramate] Other (See Comments)     Hallucinations           REVIEW OF SYSTEMS     A 12 point review of system was performed and was negative except as outlined in the history of present illness.     HOME MEDICATIONS     Current Outpatient Rx   Medication Sig Dispense Refill     calcium carbonate (TUMS) 500 MG chewable tablet Take 1-2 tablets by mouth daily as needed       Cyanocobalamin (VITAMIN B-12 PO) Take 1 tablet by mouth 2 times daily       levETIRAcetam (KEPPRA) 750 MG tablet Take 2 tablets (1,500 mg) by mouth 2 times daily 360 tablet 3     loratadine (CLARITIN) 10 MG tablet Take 1 tablet by mouth       losartan (COZAAR) 25 MG tablet 2 tabs in the am, 1 tab in the pm       omeprazole (PRILOSEC) 20 MG DR capsule Take 20 mg by mouth daily       vitamin D2 (ERGOCALCIFEROL) 05472 units (1250 mcg) capsule Take 50,000 Units by mouth once a week On Monday       vitamin D3 (CHOLECALCIFEROL) 1.25 MG (92600 UT) capsule            PHYSICAL EXAM     Vital signs  BP (!) 165/111 (BP Location: Right arm, Patient Position: Sitting)   Pulse 90   Ht 1.753 m (5' 9\")   Wt 101.6 kg (224 lb)   BMI 33.08 kg/m      Weight:   224 lbs 0 oz    Patient is alert and oriented " x4 in no acute distress. Vital signs were reviewed and are documented in electronic medical record. Neck was supple, no carotid bruits, thyromegaly, JVD, or lymphadenopathy was noted.  NEUROLOGY EXAM:    Patient s speech sounds deliberate with difficulty expressing himself but at times he is able to talk normally    Funduscopic exam was normal, with normal cup to disc ratio. Cranial nerves II -XII were intact.    Patient had normal mass, tone and motor strength was 5/5 in all extremities without pronator drift.    Sensation was intact to light touch, pinprick, and vibratory sensation.    Reflexes were symmetrical with downgoing toes.    No dysmetria noted on FNF or HKS.    Gait testing was normal. Romberg was negative.     DIAGNOSTIC STUDIES     PERTINENT RADIOLOGY  Following imaging studies were reviewed:     MRI 9/13/18  1. No acute/subacute infarcts, mass lesions, hydrocephalus or MRI evidence of intracranial hemorrhage. No abnormal intracranial enhancement.  2. Intracranial contents are stable compared to brain MRI 12/26/2014.    EEG 9/13/18  Impression: This is a normal awake and drowsy EEG     PERTINENT LABS  Following labs were reviewed:  Lab Requisition on 10/18/2021   Component Date Value     Triglycerides 10/18/2021 564*     Patient Fasting > 8hrs? 10/18/2021 Yes    Lab Requisition on 09/09/2021   Component Date Value     Sodium 09/09/2021 141      Potassium 09/09/2021 3.6      Chloride 09/09/2021 100      Carbon Dioxide (CO2) 09/09/2021 29      Anion Gap 09/09/2021 12      Urea Nitrogen 09/09/2021 10      Creatinine 09/09/2021 0.84      Calcium 09/09/2021 9.4      Glucose 09/09/2021 72      Alkaline Phosphatase 09/09/2021 102      AST 09/09/2021 23      ALT 09/09/2021 25      Protein Total 09/09/2021 7.6      Albumin 09/09/2021 4.0      Bilirubin Total 09/09/2021 0.3      GFR Estimate 09/09/2021 >90      TSH 09/09/2021 0.52      WBC Count 09/09/2021 7.1      RBC Count 09/09/2021 4.50      Hemoglobin  09/09/2021 13.5      Hematocrit 09/09/2021 41.5      MCV 09/09/2021 92      MCH 09/09/2021 30.0      MCHC 09/09/2021 32.5      RDW 09/09/2021 12.9      Platelet Count 09/09/2021 311      Cholesterol 09/09/2021 183      Triglycerides 09/09/2021 578*     Direct Measure HDL 09/09/2021 33*     LDL Cholesterol Calculat* 09/09/2021       Sex Hormone Binding Glob* 09/09/2021 15      Free Testosterone Calcul* 09/09/2021 9.85      Testosterone Total 09/09/2021 341      Sex Hormone Binding Glob* 09/09/2021 15      LDL Cholesterol Direct 09/09/2021 73          Total time spent for face to face visit, reviewing labs/imaging studies, counseling and coordination of care was: 30 Minutes spent on the date of the encounter doing chart review, review of outside records, review of test results, interpretation of tests, patient visit and documentation       This note was dictated using voice recognition software.  Any grammatical or context distortions are unintentional and inherent to the software.    Orders Placed This Encounter   Procedures     Keppra (Levetiracetam) Level     Comprehensive metabolic panel     EEG Routine      New Prescriptions    No medications on file     Modified Medications    No medications on file                     Again, thank you for allowing me to participate in the care of your patient.        Sincerely,        Hang Covarrubias MD

## 2021-11-19 NOTE — PROGRESS NOTES
NEUROLOGY FOLLOW UP VISIT  NOTE       [facility] NEUROLOGY [place]  [address] [place] [address]  Tel: [phone]  Fax: [phone]  [email or web address]     Joel Gray,  1975, MRN [id number]  PCP: Joan Fletcher  Date: 2021      ASSESSMENT & PLAN     Visit Diagnosis  1.  Pseudoseizures (H)  2. Partial symptomatic epilepsy with complex partial seizures, not intractable, without status epilepticus (H)     Complex partial seizures  46-year-old male with history of pseudoseizures and complex partial seizures who returns for follow-up sooner than his scheduled appointment.  He thinks he had a breakthrough seizure as he woke up in the morning and realized he bit his tongue and had generalized aching.  He denies missing any medication.  I have recommended:    1.  Continue Keppra 750 mg 2 tablets twice a day  2.  Check Keppra level and comprehensive metabolic panel  3.  EEG  4.  Follow-up in 3 months    Thank you again for this referral, please feel free to contact me if you have any questions.    Hang Covarrubias MD  [facility]  (Former[facility], [facility]     HISTORY OF PRESENT ILLNESS     Patient is a 46-year-old male with history of complex partial seizures, pseudoseizures, depression with anxiety, HTN, vitamin B12 deficiency who was last seen on 2021 and is here earlier than his scheduled visit.  He reports recently had a breakthrough seizure.  He woke up in the morning and realized he bit his tongue and he had generalized aches.  He denies missing any medication, fever, chills or other symptoms that could have triggered his seizures.  It is at times difficult to interpret his symptoms due to his mental health issues and in the past there was a concern he has both pseudoseizures and real seizures    Patient started having seizures in  and according to his girlfriend he would have shaking at night that  sounded more like shivering rather than a seizure.  He was admitted to [place] [facility] and had 3 days of monitoring and although EEG was normal he was started on Depakote for mood stabilization.  Since that admission he has been seen in the emergency room multiple times for seizure that were felt to be due to pseudoseizure.  While being on Depakote he developed pancreatitis and was taken off Depakote and switched to Keppra.  Initially he had difficulty tolerating Keppra but afterwards adjusted to the dose.  He was also evaluated by neuropsychology and was felt to have impaired range of overall intellectual ability and is being followed by psychological services for depression and impulse control disorder.     PROBLEM LIST   Patient Active Problem List   Diagnosis Code     Borderline intellectual functioning R41.83     Mixed anxiety depressive disorder F41.8     Dysphonia R49.0     Gastroesophageal reflux disease K21.9     Hypertension I10     Intermittent explosive disorder F63.81     Pseudoseizures (H) R56.9     Psychosis (H) F29     Cobalamin deficiency E53.8     Epilepsy with partial complex seizures (H) G40.209     Obesity E66.9     Tobacco user Z72.0     Vitamin D deficiency E55.9     Suicidal ideation R45.851         PAST MEDICAL & SURGICAL HISTORY     Past Medical History:   Patient  has no past medical history on file.    Surgical History:  He  has a past surgical history that includes other surgical history; Cholecystectomy; and hernia repair.     SOCIAL HISTORY     Reviewed, and he  reports that he has quit smoking. He has never used smokeless tobacco. He reports previous alcohol use.     FAMILY HISTORY     Reviewed, and family history includes Diabetes in his mother; Hypertension in his mother; Seizure Disorder in his mother. He was adopted.     ALLERGIES     Allergies   Allergen Reactions     Aripiprazole      Other reaction(s): Agitation  Suicidal and terroristic thoughts     Acetaminophen  "Other (See Comments)     Seizure       Amoxicillin      Aspirin      Carbamazepine      Citalopram      Other reaction(s): Unknown     Codeine Other (See Comments)     seizure     Dexilant      Other reaction(s): Unknown     Divalproex Sodium [Valproic Acid] Other (See Comments)     Pancreatitis       Ibuprofen      Other reaction(s): rash     Iodine Other (See Comments)     Seizure       Morphine      Trazodone      Other reaction(s): Unknown     Diatrizoate Rash     Phenytoin Rash     Topamax [Topiramate] Other (See Comments)     Hallucinations           REVIEW OF SYSTEMS     A 12 point review of system was performed and was negative except as outlined in the history of present illness.     HOME MEDICATIONS     Current Outpatient Rx   Medication Sig Dispense Refill     calcium carbonate (TUMS) 500 MG chewable tablet Take 1-2 tablets by mouth daily as needed       Cyanocobalamin (VITAMIN B-12 PO) Take 1 tablet by mouth 2 times daily       levETIRAcetam (KEPPRA) 750 MG tablet Take 2 tablets (1,500 mg) by mouth 2 times daily 360 tablet 3     loratadine (CLARITIN) 10 MG tablet Take 1 tablet by mouth       losartan (COZAAR) 25 MG tablet 2 tabs in the am, 1 tab in the pm       omeprazole (PRILOSEC) 20 MG DR capsule Take 20 mg by mouth daily       vitamin D2 (ERGOCALCIFEROL) 24096 units (1250 mcg) capsule Take 50,000 Units by mouth once a week On Monday       vitamin D3 (CHOLECALCIFEROL) 1.25 MG (71418 UT) capsule            PHYSICAL EXAM     Vital signs  BP (!) 165/111 (BP Location: Right arm, Patient Position: Sitting)   Pulse 90   Ht 1.753 m (5' 9\")   Wt 101.6 kg (224 lb)   BMI 33.08 kg/m      Weight:   224 lbs 0 oz    Patient is alert and oriented x4 in no acute distress. Vital signs were reviewed and are documented in electronic medical record. Neck was supple, no carotid bruits, thyromegaly, JVD, or lymphadenopathy was noted.  NEUROLOGY EXAM:    Patient s speech sounds deliberate with difficulty expressing " himself but at times he is able to talk normally    Funduscopic exam was normal, with normal cup to disc ratio. Cranial nerves II -XII were intact.    Patient had normal mass, tone and motor strength was 5/5 in all extremities without pronator drift.    Sensation was intact to light touch, pinprick, and vibratory sensation.    Reflexes were symmetrical with downgoing toes.    No dysmetria noted on FNF or HKS.    Gait testing was normal. Romberg was negative.     DIAGNOSTIC STUDIES     PERTINENT RADIOLOGY  Following imaging studies were reviewed:     MRI 9/13/18  1. No acute/subacute infarcts, mass lesions, hydrocephalus or MRI evidence of intracranial hemorrhage. No abnormal intracranial enhancement.  2. Intracranial contents are stable compared to brain MRI 12/26/2014.    EEG 9/13/18  Impression: This is a normal awake and drowsy EEG     PERTINENT LABS  Following labs were reviewed:  Lab Requisition on 10/18/2021   Component Date Value     Triglycerides 10/18/2021 564*     Patient Fasting > 8hrs? 10/18/2021 Yes    Lab Requisition on 09/09/2021   Component Date Value     Sodium 09/09/2021 141      Potassium 09/09/2021 3.6      Chloride 09/09/2021 100      Carbon Dioxide (CO2) 09/09/2021 29      Anion Gap 09/09/2021 12      Urea Nitrogen 09/09/2021 10      Creatinine 09/09/2021 0.84      Calcium 09/09/2021 9.4      Glucose 09/09/2021 72      Alkaline Phosphatase 09/09/2021 102      AST 09/09/2021 23      ALT 09/09/2021 25      Protein Total 09/09/2021 7.6      Albumin 09/09/2021 4.0      Bilirubin Total 09/09/2021 0.3      GFR Estimate 09/09/2021 >90      TSH 09/09/2021 0.52      WBC Count 09/09/2021 7.1      RBC Count 09/09/2021 4.50      Hemoglobin 09/09/2021 13.5      Hematocrit 09/09/2021 41.5      MCV 09/09/2021 92      MCH 09/09/2021 30.0      MCHC 09/09/2021 32.5      RDW 09/09/2021 12.9      Platelet Count 09/09/2021 311      Cholesterol 09/09/2021 183      Triglycerides 09/09/2021 578*     Direct Measure HDL  09/09/2021 33*     LDL Cholesterol Calculat* 09/09/2021       Sex Hormone Binding Glob* 09/09/2021 15      Free Testosterone Calcul* 09/09/2021 9.85      Testosterone Total 09/09/2021 341      Sex Hormone Binding Glob* 09/09/2021 15      LDL Cholesterol Direct 09/09/2021 73          Total time spent for face to face visit, reviewing labs/imaging studies, counseling and coordination of care was: 30 Minutes spent on the date of the encounter doing chart review, review of outside records, review of test results, interpretation of tests, patient visit and documentation       This note was dictated using voice recognition software.  Any grammatical or context distortions are unintentional and inherent to the software.    Orders Placed This Encounter   Procedures     Keppra (Levetiracetam) Level     Comprehensive metabolic panel     EEG Routine      New Prescriptions    No medications on file     Modified Medications    No medications on file

## 2021-11-19 NOTE — PATIENT INSTRUCTIONS
Patient Education     Discharge Instructions for Epilepsy  You have been diagnosed with epilepsy, a disorder of recurring seizures. When you have a seizure, an electrical disturbance happens in your brain. There are different kinds of seizures, and each person may have one or many types of seizures. Here are some guidelines for you and your family.  If you have a seizure  Ask friends and family members to learn seizure management. Also, tell them to do the following if you have a seizure:    Clear the area to prevent injury.    Position you on a flat, carpeted surface, if possible.    Don t try to restrain you.    Don t put anything in your mouth.    Turn you onto your side if you start to vomit.    Keep track of the date and time the seizure started, how long it lasted, whether or not you lost consciousness, a description of your body movements, what provoked the seizure (if known), and any injuries you suffered. Using a watch may help keep correct time of events.      Stay with you until you regain consciousness.    Call 911 if the seizure is longer than 5 minutes, if there are multiple seizures, or if you don't start to wake up after the seizure stops.    Activities  Following are some things to consider:    Enjoy your normal activities. Most people with epilepsy lead normal lives.    Don't do hazardous activities, such as mountain climbing or scuba diving. A seizure under these conditions could lead to a fatal accident.    Don't swim alone or participate in other similar activities without others nearby.    Ask your healthcare provider about any restrictions on driving or other activities.    Check with your state department of public safety to learn whether there are any driving limitations based on your condition.  Other home care  Other considerations:    Take your medicine exactly as directed. Skipping doses can affect the way your body handles the medicine, which could cause you to have a  seizure.    Don t drink alcohol or use any medicine without talking with your healthcare provider first.    Seizure medicines may interact with other medicines. Make sure all of your healthcare providers have a list of all your medicines.     Birth control pills may not work as effectively when taking seizure medicines. Ask your healthcare provider if a change in birth control is needed.     Wear a medical alert pendant or bracelet that alerts others to your condition, especially if you are allergic to seizure medicine.    Join a local support group. Ask your healthcare provider for names and phone numbers.  Call 911  Tell your family members or friends to call 911 right away if you have:    Seizure that lasts more than 5 minutes    Multiple seizures in a row    No recovery of consciousness after the seizure stops  When to call your healthcare provider  Have family members or friends call your healthcare provider right away if you have:    Seizures that are getting longer and worse    Seizures that are different from those you ve had in the past    Seizures strong enough to cause injury    Skin rash    Fever of 100.4 F (38 C) or higher, or as directed by your healthcare provider  Helga last reviewed this educational content on 4/1/2018 2000-2021 [employer]. All rights reserved. This information is not intended as a substitute for professional medical care. Always follow your healthcare professional's instructions.

## 2021-11-19 NOTE — NURSING NOTE
Chief Complaint   Patient presents with     Seizures     Elizabeth Ojeda MA on 11/19/2021 at 12:33 PM

## 2021-12-03 ENCOUNTER — LAB (OUTPATIENT)
Dept: LAB | Facility: CLINIC | Age: 46
End: 2021-12-03
Payer: MEDICARE

## 2021-12-03 DIAGNOSIS — G40.209 PARTIAL SYMPTOMATIC EPILEPSY WITH COMPLEX PARTIAL SEIZURES, NOT INTRACTABLE, WITHOUT STATUS EPILEPTICUS (H): ICD-10-CM

## 2021-12-03 LAB
ALBUMIN SERPL-MCNC: 3.7 G/DL (ref 3.4–5)
ALP SERPL-CCNC: 98 U/L (ref 40–150)
ALT SERPL W P-5'-P-CCNC: 53 U/L (ref 0–70)
ANION GAP SERPL CALCULATED.3IONS-SCNC: 5 MMOL/L (ref 3–14)
AST SERPL W P-5'-P-CCNC: 35 U/L (ref 0–45)
BILIRUB SERPL-MCNC: 0.4 MG/DL (ref 0.2–1.3)
BUN SERPL-MCNC: 11 MG/DL (ref 7–30)
CALCIUM SERPL-MCNC: 9.1 MG/DL (ref 8.5–10.1)
CHLORIDE BLD-SCNC: 103 MMOL/L (ref 94–109)
CO2 SERPL-SCNC: 31 MMOL/L (ref 20–32)
CREAT SERPL-MCNC: 1.03 MG/DL (ref 0.66–1.25)
GFR SERPL CREATININE-BSD FRML MDRD: 86 ML/MIN/1.73M2
GLUCOSE BLD-MCNC: 84 MG/DL (ref 70–99)
POTASSIUM BLD-SCNC: 3.3 MMOL/L (ref 3.4–5.3)
PROT SERPL-MCNC: 8.5 G/DL (ref 6.8–8.8)
SODIUM SERPL-SCNC: 139 MMOL/L (ref 133–144)

## 2021-12-03 PROCEDURE — 80177 DRUG SCRN QUAN LEVETIRACETAM: CPT | Mod: 90

## 2021-12-03 PROCEDURE — 80053 COMPREHEN METABOLIC PANEL: CPT

## 2021-12-03 PROCEDURE — 36415 COLL VENOUS BLD VENIPUNCTURE: CPT

## 2021-12-06 LAB — LEVETIRACETAM SERPL-MCNC: 32 UG/ML

## 2021-12-14 ENCOUNTER — TELEPHONE (OUTPATIENT)
Dept: NEUROLOGY | Facility: CLINIC | Age: 46
End: 2021-12-14
Payer: MEDICARE

## 2021-12-14 NOTE — TELEPHONE ENCOUNTER
[facility] Call Center    Phone Message    May a detailed message be left on voicemail: yes     Reason for Call: Other: Patient called to notify Dr. Covarrubias that he is scheduled for EEG on 12/16. Also, for doctor or nurse to call him back regarding his last lab results.    Action Taken: Message routed to:  [facility] neurology    Travel Screening: Not Applicable

## 2021-12-15 NOTE — TELEPHONE ENCOUNTER
Left message for patient that lab results were normal. We will call him to schedule follow up.     Melissa Jacobson MA,CMA,12:57 PM    Principal Discharge DX:	Term birth of  male Principal Discharge DX:	Term birth of  male  Assessment and plan of treatment:	Follow-up with your pediatrician within 48 hours of discharge. Continue feeding child at least every 3 hours, wake baby to feed if needed. Please contact your pediatrician and return to the hospital if you notice any of the following:   - Fever  (T > 100.4)  - Reduced amount of wet diapers (< 5-6 per day) or no wet diaper in 12 hours  - Increased fussiness, irritability, or crying inconsolably  - Lethargy (excessively sleepy, difficult to arouse)  - Breathing difficulties (noisy breathing, increased work of breathing)  - Changes in the baby’s color (yellow, blue, pale, gray)  - Seizure or loss of consciousness

## 2021-12-16 ENCOUNTER — HOSPITAL ENCOUNTER (OUTPATIENT)
Dept: NEUROLOGY | Facility: CLINIC | Age: 46
Discharge: HOME | End: 2021-12-16
Attending: PSYCHIATRY & NEUROLOGY | Admitting: PSYCHIATRY & NEUROLOGY
Payer: MEDICARE

## 2021-12-16 DIAGNOSIS — G40.209 PARTIAL SYMPTOMATIC EPILEPSY WITH COMPLEX PARTIAL SEIZURES, NOT INTRACTABLE, WITHOUT STATUS EPILEPTICUS (H): ICD-10-CM

## 2021-12-16 PROCEDURE — 95812 EEG 41-60 MINUTES: CPT

## 2021-12-16 PROCEDURE — 95812 EEG 41-60 MINUTES: CPT | Mod: 26 | Performed by: PSYCHIATRY & NEUROLOGY

## 2022-01-10 ENCOUNTER — ANCILLARY PROCEDURE (OUTPATIENT)
Dept: NEUROLOGY | Facility: CLINIC | Age: 47
End: 2022-01-10
Attending: PSYCHIATRY & NEUROLOGY
Payer: COMMERCIAL

## 2022-01-10 DIAGNOSIS — G40.209 PARTIAL SYMPTOMATIC EPILEPSY WITH COMPLEX PARTIAL SEIZURES, NOT INTRACTABLE, WITHOUT STATUS EPILEPTICUS (H): ICD-10-CM

## 2022-03-14 ENCOUNTER — OFFICE VISIT (OUTPATIENT)
Dept: NEUROLOGY | Facility: CLINIC | Age: 47
End: 2022-03-14
Payer: COMMERCIAL

## 2022-03-14 VITALS
BODY MASS INDEX: 33.18 KG/M2 | WEIGHT: 224 LBS | DIASTOLIC BLOOD PRESSURE: 106 MMHG | SYSTOLIC BLOOD PRESSURE: 183 MMHG | HEART RATE: 95 BPM | HEIGHT: 69 IN

## 2022-03-14 DIAGNOSIS — G40.209 PARTIAL SYMPTOMATIC EPILEPSY WITH COMPLEX PARTIAL SEIZURES, NOT INTRACTABLE, WITHOUT STATUS EPILEPTICUS (H): Primary | ICD-10-CM

## 2022-03-14 DIAGNOSIS — F44.5 PSEUDOSEIZURES: ICD-10-CM

## 2022-03-14 PROCEDURE — 99213 OFFICE O/P EST LOW 20 MIN: CPT | Performed by: PSYCHIATRY & NEUROLOGY

## 2022-03-14 NOTE — PROGRESS NOTES
NEUROLOGY FOLLOW UP VISIT  NOTE       [facility] [place]  [address] [place] [address]  Tel: [phone]  Fax: [phone]  [email or web address]     Joel Gray,  1975, MRN [id number]  PCP: Joan Fletcher  Date: 2022      ASSESSMENT & PLAN     Visit Diagnosis   Partial symptomatic epilepsy with complex partial seizures, not intractable, without status epilepticus (H)  Pseudoseizures (H)     Complex partial seizures  46-year-old male with history of pseudoseizures, complex partial seizures who was recently seen after he felt he had a breakthrough seizure as he woke up and realized he bit his tongue and had generalized body aches.  He had a repeat EEG that was normal.  Lab work showed a therapeutic Keppra level.  I have asked him to continue on Keppra 750 mg twice daily.  Regular follow-up will be as scheduled on a yearly basis.    Thank you again for this referral, please feel free to contact me if you have any questions.    Hang Covarrubias MD  [facility]  (Formerly, [facility]     HISTORY OF PRESENT ILLNESS     Patient is a 46-year-old male with history of complex partial seizure, pseudoseizures, depression with anxiety, HTN, B12 deficiency who was last seen in our clinic on 2021 when he reported possible seizure as he woke up and noticed that he had bit his tongue and had generalized body aches.  He was continued on Keppra and level was checked that was therapeutic at 32.  He also had an EEG that was within normal limits.  Since his last visit he reports no further seizures.  He is feels back to his baseline.  He denies unexplained loss of consciousness or any tonic-clonic activity.    Patient started having seizures in  and according to his girlfriend he would have shaking at night that sounded more like shivering rather than a seizure.  He was admitted to [place] [facility] and  had 3 days of monitoring and although EEG was normal he was started on Depakote for mood stabilization.  Since that admission he has been seen in the emergency room multiple times for seizure that were felt to be pseudoseizure.  While being on Depakote he developed pancreatitis and was taken off Depakote and switched to Keppra.  Initially he had difficulty tolerating Keppra but afterwards adjusted to the dose.  He was also evaluated by neuropsychology and was felt to have impaired range of overall intellectual ability and is being followed by psychological services for depression and impulse control disorder.     PROBLEM LIST   Patient Active Problem List   Diagnosis Code     Borderline intellectual functioning R41.83     Mixed anxiety depressive disorder F41.8     Dysphonia R49.0     Gastroesophageal reflux disease K21.9     Hypertension I10     Intermittent explosive disorder F63.81     Pseudoseizures (H) R56.9     Psychosis (H) F29     Cobalamin deficiency E53.8     Epilepsy with partial complex seizures (H) G40.209     Obesity E66.9     Tobacco user Z72.0     Vitamin D deficiency E55.9     Suicidal ideation R45.851         PAST MEDICAL & SURGICAL HISTORY     Past Medical History:   Patient  has no past medical history on file.    Surgical History:  He  has a past surgical history that includes other surgical history; Cholecystectomy; and hernia repair.     SOCIAL HISTORY     Reviewed, and he  reports that he has quit smoking. He has never used smokeless tobacco. He reports previous alcohol use.     FAMILY HISTORY     Reviewed, and family history includes Diabetes in his mother; Hypertension in his mother; Seizure Disorder in his mother. He was adopted.     ALLERGIES     Allergies   Allergen Reactions     Aripiprazole      Other reaction(s): Agitation  Suicidal and terroristic thoughts     Acetaminophen Other (See Comments)     Seizure       Amoxicillin      Aspirin      Carbamazepine      Citalopram      Other  "reaction(s): Unknown     Codeine Other (See Comments)     seizure     Dexilant      Other reaction(s): Unknown     Divalproex Sodium [Valproic Acid] Other (See Comments)     Pancreatitis       Ibuprofen      Other reaction(s): rash     Iodine Other (See Comments)     Seizure       Morphine      Trazodone      Other reaction(s): Unknown     Diatrizoate Rash     Phenytoin Rash     Topamax [Topiramate] Other (See Comments)     Hallucinations           REVIEW OF SYSTEMS     A 12 point review of system was performed and was negative except as outlined in the history of present illness.     HOME MEDICATIONS     Current Outpatient Rx   Medication Sig Dispense Refill     Cyanocobalamin (VITAMIN B-12 PO) Take 1 tablet by mouth 2 times daily       levETIRAcetam (KEPPRA) 750 MG tablet Take 2 tablets (1,500 mg) by mouth 2 times daily 360 tablet 3     losartan (COZAAR) 25 MG tablet 2 tabs in the am, 1 tab in the pm       omeprazole (PRILOSEC) 20 MG DR capsule Take 20 mg by mouth daily       vitamin D2 (ERGOCALCIFEROL) 63449 units (1250 mcg) capsule Take 50,000 Units by mouth once a week On Monday       calcium carbonate (TUMS) 500 MG chewable tablet Take 1-2 tablets by mouth daily as needed       vitamin D3 (CHOLECALCIFEROL) 1.25 MG (43072 UT) capsule            PHYSICAL EXAM     Vital signs  BP (!) 183/106 (BP Location: Left arm, Patient Position: Sitting)   Pulse 95   Ht 1.753 m (5' 9\")   Wt 101.6 kg (224 lb)   BMI 33.08 kg/m      Weight:   224 lbs 0 oz    Patient is alert and oriented x4 in no acute distress. Vital signs were reviewed and are documented in electronic medical record. Neck was supple, no carotid bruits, thyromegaly, JVD, or lymphadenopathy was noted.  NEUROLOGY EXAM:    Patient s speech sounds deliberate with difficulty expressing himself but at times he is able to talk normally    Funduscopic exam was normal, with normal cup to disc ratio. Cranial nerves II -XII were intact.    Patient had normal mass, " tone and motor strength was 5/5 in all extremities without pronator drift.    Sensation was intact to light touch, pinprick, and vibratory sensation.    Reflexes were symmetrical with downgoing toes.    No dysmetria noted on FNF or HKS.    Gait testing was normal.      PERTINENT DIAGNOSTIC STUDIES     Following studies were reviewed:     MRI 9/13/18  1. No acute/subacute infarcts, mass lesions, hydrocephalus or MRI evidence of intracranial hemorrhage. No abnormal intracranial enhancement.  2. Intracranial contents are stable compared to brain MRI 12/26/2014.    EEG 1/10/2022  Normal in wakefulness and drowsiness. Epileptiform discharges or seizures were not seen in this 31 minute recording.    EEG 9/13/18  Impression: This is a normal awake and drowsy EEG     PERTINENT LABS  Following labs were reviewed:  No visits with results within 3 Month(s) from this visit.   Latest known visit with results is:   Lab on 12/03/2021   Component Date Value     Sodium 12/03/2021 139      Potassium 12/03/2021 3.3 (A)     Chloride 12/03/2021 103      Carbon Dioxide (CO2) 12/03/2021 31      Anion Gap 12/03/2021 5      Urea Nitrogen 12/03/2021 11      Creatinine 12/03/2021 1.03      Calcium 12/03/2021 9.1      Glucose 12/03/2021 84      Alkaline Phosphatase 12/03/2021 98      AST 12/03/2021 35      ALT 12/03/2021 53      Protein Total 12/03/2021 8.5      Albumin 12/03/2021 3.7      Bilirubin Total 12/03/2021 0.4      GFR Estimate 12/03/2021 86      Keppra (Levetiracetam) L* 12/03/2021 32          Total time spent for face to face visit, reviewing labs/imaging studies, counseling and coordination of care was: 20 Minutes spent on the date of the encounter doing chart review, review of outside records, review of test results, interpretation of tests, patient visit and documentation     This note was dictated using voice recognition software.  Any grammatical or context distortions are unintentional and inherent to the software.    No  orders of the defined types were placed in this encounter.     New Prescriptions    No medications on file     Modified Medications    No medications on file

## 2022-03-14 NOTE — LETTER
3/14/2022         RE: Joel Gray  [address]  [facility] [address]        Dear Colleague,    Thank you for referring your patient, Joel Gray, to the [facility] [place]. Please see a copy of my visit note below.    NEUROLOGY FOLLOW UP VISIT  NOTE       [facility] [place]  [address] [place] [address]  Tel: [phone]  Fax: [phone]  [email or web address]     Joel Gray,  1975, MRN [id number]  PCP: Joan Fletcher  Date: 2022      ASSESSMENT & PLAN     Visit Diagnosis  1.  Partial symptomatic epilepsy with complex partial seizures, not intractable, without status epilepticus (H)  2. Pseudoseizures (H)     Complex partial seizures  46-year-old male with history of pseudoseizures, complex partial seizures who was recently seen after he felt he had a breakthrough seizure as he woke up and realized he bit his tongue and had generalized body aches.  He had a repeat EEG that was normal.  Lab work showed a therapeutic Keppra level.  I have asked him to continue on Keppra 750 mg twice daily.  Regular follow-up will be as scheduled on a yearly basis.    Thank you again for this referral, please feel free to contact me if you have any questions.    Hang Covarrubias MD  [facility]  ([facility], [facility]     HISTORY OF PRESENT ILLNESS     Patient is a 46-year-old male with history of complex partial seizure, pseudoseizures, depression with anxiety, HTN, B12 deficiency who was last seen in our clinic on 2021 when he reported possible seizure as he woke up and noticed that he had bit his tongue and had generalized body aches.  He was continued on Keppra and level was checked that was therapeutic at 32.  He also had an EEG that was within normal limits.  Since his last visit he reports no further seizures.  He is feels back to his baseline.  He denies  unexplained loss of consciousness or any tonic-clonic activity.    Patient started having seizures in 1997 and according to his girlfriend he would have shaking at night that sounded more like shivering rather than a seizure.  He was admitted to [place] [facility] and had 3 days of monitoring and although EEG was normal he was started on Depakote for mood stabilization.  Since that admission he has been seen in the emergency room multiple times for seizure that were felt to be pseudoseizure.  While being on Depakote he developed pancreatitis and was taken off Depakote and switched to Keppra.  Initially he had difficulty tolerating Keppra but afterwards adjusted to the dose.  He was also evaluated by neuropsychology and was felt to have impaired range of overall intellectual ability and is being followed by psychological services for depression and impulse control disorder.     PROBLEM LIST   Patient Active Problem List   Diagnosis Code     Borderline intellectual functioning R41.83     Mixed anxiety depressive disorder F41.8     Dysphonia R49.0     Gastroesophageal reflux disease K21.9     Hypertension I10     Intermittent explosive disorder F63.81     Pseudoseizures (H) R56.9     Psychosis (H) F29     Cobalamin deficiency E53.8     Epilepsy with partial complex seizures (H) G40.209     Obesity E66.9     Tobacco user Z72.0     Vitamin D deficiency E55.9     Suicidal ideation R45.851         PAST MEDICAL & SURGICAL HISTORY     Past Medical History:   Patient  has no past medical history on file.    Surgical History:  He  has a past surgical history that includes other surgical history; Cholecystectomy; and hernia repair.     SOCIAL HISTORY     Reviewed, and he  reports that he has quit smoking. He has never used smokeless tobacco. He reports previous alcohol use.     FAMILY HISTORY     Reviewed, and family history includes Diabetes in his mother; Hypertension in his mother; Seizure Disorder in his mother. He  "was adopted.     ALLERGIES     Allergies   Allergen Reactions     Aripiprazole      Other reaction(s): Agitation  Suicidal and terroristic thoughts     Acetaminophen Other (See Comments)     Seizure       Amoxicillin      Aspirin      Carbamazepine      Citalopram      Other reaction(s): Unknown     Codeine Other (See Comments)     seizure     Dexilant      Other reaction(s): Unknown     Divalproex Sodium [Valproic Acid] Other (See Comments)     Pancreatitis       Ibuprofen      Other reaction(s): rash     Iodine Other (See Comments)     Seizure       Morphine      Trazodone      Other reaction(s): Unknown     Diatrizoate Rash     Phenytoin Rash     Topamax [Topiramate] Other (See Comments)     Hallucinations           REVIEW OF SYSTEMS     A 12 point review of system was performed and was negative except as outlined in the history of present illness.     HOME MEDICATIONS     Current Outpatient Rx   Medication Sig Dispense Refill     Cyanocobalamin (VITAMIN B-12 PO) Take 1 tablet by mouth 2 times daily       levETIRAcetam (KEPPRA) 750 MG tablet Take 2 tablets (1,500 mg) by mouth 2 times daily 360 tablet 3     losartan (COZAAR) 25 MG tablet 2 tabs in the am, 1 tab in the pm       omeprazole (PRILOSEC) 20 MG DR capsule Take 20 mg by mouth daily       vitamin D2 (ERGOCALCIFEROL) 00799 units (1250 mcg) capsule Take 50,000 Units by mouth once a week On Monday       calcium carbonate (TUMS) 500 MG chewable tablet Take 1-2 tablets by mouth daily as needed       vitamin D3 (CHOLECALCIFEROL) 1.25 MG (71859 UT) capsule            PHYSICAL EXAM     Vital signs  BP (!) 183/106 (BP Location: Left arm, Patient Position: Sitting)   Pulse 95   Ht 1.753 m (5' 9\")   Wt 101.6 kg (224 lb)   BMI 33.08 kg/m      Weight:   224 lbs 0 oz    Patient is alert and oriented x4 in no acute distress. Vital signs were reviewed and are documented in electronic medical record. Neck was supple, no carotid bruits, thyromegaly, JVD, or " lymphadenopathy was noted.  NEUROLOGY EXAM:    Patient s speech sounds deliberate with difficulty expressing himself but at times he is able to talk normally    Funduscopic exam was normal, with normal cup to disc ratio. Cranial nerves II -XII were intact.    Patient had normal mass, tone and motor strength was 5/5 in all extremities without pronator drift.    Sensation was intact to light touch, pinprick, and vibratory sensation.    Reflexes were symmetrical with downgoing toes.    No dysmetria noted on FNF or HKS.    Gait testing was normal.      PERTINENT DIAGNOSTIC STUDIES     Following studies were reviewed:     MRI 9/13/18  1. No acute/subacute infarcts, mass lesions, hydrocephalus or MRI evidence of intracranial hemorrhage. No abnormal intracranial enhancement.  2. Intracranial contents are stable compared to brain MRI 12/26/2014.    EEG 1/10/2022  Normal in wakefulness and drowsiness. Epileptiform discharges or seizures were not seen in this 31 minute recording.    EEG 9/13/18  Impression: This is a normal awake and drowsy EEG     PERTINENT LABS  Following labs were reviewed:  No visits with results within 3 Month(s) from this visit.   Latest known visit with results is:   Lab on 12/03/2021   Component Date Value     Sodium 12/03/2021 139      Potassium 12/03/2021 3.3 (A)     Chloride 12/03/2021 103      Carbon Dioxide (CO2) 12/03/2021 31      Anion Gap 12/03/2021 5      Urea Nitrogen 12/03/2021 11      Creatinine 12/03/2021 1.03      Calcium 12/03/2021 9.1      Glucose 12/03/2021 84      Alkaline Phosphatase 12/03/2021 98      AST 12/03/2021 35      ALT 12/03/2021 53      Protein Total 12/03/2021 8.5      Albumin 12/03/2021 3.7      Bilirubin Total 12/03/2021 0.4      GFR Estimate 12/03/2021 86      Keppra (Levetiracetam) L* 12/03/2021 32          Total time spent for face to face visit, reviewing labs/imaging studies, counseling and coordination of care was: 20 Minutes spent on the date of the encounter  doing chart review, review of outside records, review of test results, interpretation of tests, patient visit and documentation       This note was dictated using voice recognition software.  Any grammatical or context distortions are unintentional and inherent to the software.    No orders of the defined types were placed in this encounter.     New Prescriptions    No medications on file     Modified Medications    No medications on file                     Again, thank you for allowing me to participate in the care of your patient.        Sincerely,        Hang Covarrubias MD

## 2022-03-23 ENCOUNTER — LAB REQUISITION (OUTPATIENT)
Dept: LAB | Facility: CLINIC | Age: 47
End: 2022-03-23
Payer: COMMERCIAL

## 2022-03-23 DIAGNOSIS — E78.1 PURE HYPERGLYCERIDEMIA: ICD-10-CM

## 2022-03-23 DIAGNOSIS — E55.9 VITAMIN D DEFICIENCY, UNSPECIFIED: ICD-10-CM

## 2022-03-23 DIAGNOSIS — I10 ESSENTIAL (PRIMARY) HYPERTENSION: ICD-10-CM

## 2022-03-23 LAB
ALBUMIN SERPL-MCNC: 4 G/DL (ref 3.5–5)
ALP SERPL-CCNC: 76 U/L (ref 45–120)
ALT SERPL W P-5'-P-CCNC: 37 U/L (ref 0–45)
ANION GAP SERPL CALCULATED.3IONS-SCNC: 13 MMOL/L (ref 5–18)
AST SERPL W P-5'-P-CCNC: 31 U/L (ref 0–40)
BILIRUB SERPL-MCNC: 0.3 MG/DL (ref 0–1)
BUN SERPL-MCNC: 9 MG/DL (ref 8–22)
CALCIUM SERPL-MCNC: 9.8 MG/DL (ref 8.5–10.5)
CHLORIDE BLD-SCNC: 102 MMOL/L (ref 98–107)
CHOLEST SERPL-MCNC: 191 MG/DL
CO2 SERPL-SCNC: 26 MMOL/L (ref 22–31)
CREAT SERPL-MCNC: 1.16 MG/DL (ref 0.7–1.3)
FASTING STATUS PATIENT QL REPORTED: ABNORMAL
GFR SERPL CREATININE-BSD FRML MDRD: 78 ML/MIN/1.73M2
GLUCOSE BLD-MCNC: 109 MG/DL (ref 70–125)
HDLC SERPL-MCNC: 31 MG/DL
LDLC SERPL CALC-MCNC: ABNORMAL MG/DL
POTASSIUM BLD-SCNC: 4 MMOL/L (ref 3.5–5)
PROT SERPL-MCNC: 7.7 G/DL (ref 6–8)
SODIUM SERPL-SCNC: 141 MMOL/L (ref 136–145)
TRIGL SERPL-MCNC: 406 MG/DL

## 2022-03-23 PROCEDURE — 82306 VITAMIN D 25 HYDROXY: CPT | Mod: ORL | Performed by: FAMILY MEDICINE

## 2022-03-23 PROCEDURE — 80053 COMPREHEN METABOLIC PANEL: CPT | Mod: ORL | Performed by: FAMILY MEDICINE

## 2022-03-23 PROCEDURE — 80061 LIPID PANEL: CPT | Mod: ORL | Performed by: FAMILY MEDICINE

## 2022-03-23 PROCEDURE — 83721 ASSAY OF BLOOD LIPOPROTEIN: CPT | Mod: ORL | Performed by: FAMILY MEDICINE

## 2022-03-24 LAB
DEPRECATED CALCIDIOL+CALCIFEROL SERPL-MC: 67 UG/L (ref 20–75)
LDLC SERPL CALC-MCNC: 118 MG/DL

## 2022-08-01 DIAGNOSIS — G40.209 PARTIAL SYMPTOMATIC EPILEPSY WITH COMPLEX PARTIAL SEIZURES, NOT INTRACTABLE, WITHOUT STATUS EPILEPTICUS (H): ICD-10-CM

## 2022-08-01 RX ORDER — LEVETIRACETAM 750 MG/1
TABLET ORAL
Qty: 360 TABLET | Refills: 0 | Status: SHIPPED | OUTPATIENT
Start: 2022-08-01 | End: 2022-11-03

## 2022-08-01 NOTE — TELEPHONE ENCOUNTER
Refill request for [facility]   Patient last seen March 2022  Medication T'd for review and signature  Rita Lopes CMA on 8/1/2022 at 2:21 PM

## 2022-09-28 ENCOUNTER — HOSPITAL ENCOUNTER (EMERGENCY)
Facility: CLINIC | Age: 47
Discharge: HOME | End: 2022-09-28
Attending: EMERGENCY MEDICINE | Admitting: EMERGENCY MEDICINE
Payer: COMMERCIAL

## 2022-09-28 VITALS
TEMPERATURE: 96.3 F | DIASTOLIC BLOOD PRESSURE: 94 MMHG | SYSTOLIC BLOOD PRESSURE: 155 MMHG | BODY MASS INDEX: 32.58 KG/M2 | WEIGHT: 220 LBS | OXYGEN SATURATION: 94 % | HEART RATE: 96 BPM | RESPIRATION RATE: 16 BRPM | HEIGHT: 69 IN

## 2022-09-28 DIAGNOSIS — G40.209 PARTIAL SYMPTOMATIC EPILEPSY WITH COMPLEX PARTIAL SEIZURES, NOT INTRACTABLE, WITHOUT STATUS EPILEPTICUS (H): ICD-10-CM

## 2022-09-28 DIAGNOSIS — F41.8 MIXED ANXIETY DEPRESSIVE DISORDER: ICD-10-CM

## 2022-09-28 LAB
HOLD SPECIMEN: NORMAL

## 2022-09-28 PROCEDURE — 99282 EMERGENCY DEPT VISIT SF MDM: CPT | Performed by: EMERGENCY MEDICINE

## 2022-09-28 ASSESSMENT — ACTIVITIES OF DAILY LIVING (ADL)
ADLS_ACUITY_SCORE: 35
ADLS_ACUITY_SCORE: 35

## 2022-09-28 NOTE — ED TRIAGE NOTES
"Pt here with concerns for his safety per his statement. Pt denies feeling suicidal. Pt states that 6 months ago he \"had low blood work\" but never followed up. Pt states that he takes his seizure medications daily but had a seizure \"the other day\". Pt is here with his dad.     Father states that the pt called his sister stating \"I want to be put to sleep so the pain will stop\". Sister called pt's father and shared this information and asked for him to go get the patient before he hurt himself. Father states \"he gets in these moods and starts punching holes in walls\".      Triage Assessment     Row Name 09/28/22 1501       Triage Assessment (Adult)    Airway WDL WDL       Respiratory WDL    Respiratory WDL WDL       Skin Circulation/Temperature WDL    Skin Circulation/Temperature WDL WDL       Cardiac WDL    Cardiac WDL WDL       Peripheral/Neurovascular WDL    Peripheral Neurovascular WDL WDL       Cognitive/Neuro/Behavioral WDL    Cognitive/Neuro/Behavioral WDL WDL              "

## 2022-09-29 NOTE — ED PROVIDER NOTES
"  History     Chief Complaint   Patient presents with     Seizures     \"concerned for my safety\"     HPI  Joel Gray is a 47 year old male with history of intermittent explosive disorder, pseudoseizures, epilepsy with partial complex seizures, suicidal ideation, borderline electrical functioning, who comes emergency department with his sister with concern for safety.  Communication between patient and his father who is not here and patient and his sister as well as sister and father.  Apparently, he said he would like to be in the ICU empirically sleep so it will stop.  When asked what \"it\" he has no answer.  Patient says he would like to go home.  Does not want to be in the hospital.  Was being concerned about being \"locked in a room\".    Asked specifically if he is currently having any pain and responded no.  No pain in his abdomen, chest.  No nausea or vomiting.  No fevers or chills.  No cough or shortness of breath has his medications at home but cannot recall the names of them here.  Denies any thoughts of harming himself or concerns for harming others.        The patient's PMHx, Surgical Hx, Allergies, and Medications were all reviewed with the patient and patient's sister.    Allergies:  Allergies   Allergen Reactions     Aripiprazole      Other reaction(s): Agitation  Suicidal and terroristic thoughts     Acetaminophen Other (See Comments)     Seizure       Amoxicillin      Aspirin      Carbamazepine      Citalopram      Other reaction(s): Unknown     Codeine Other (See Comments)     seizure     Dexilant      Other reaction(s): Unknown     Divalproex Sodium [Valproic Acid] Other (See Comments)     Pancreatitis       Ibuprofen      Other reaction(s): rash     Iodine Other (See Comments)     Seizure       Morphine      Trazodone      Other reaction(s): Unknown     Diatrizoate Rash     Phenytoin Rash     Topamax [Topiramate] Other (See Comments)     Hallucinations         Problem List:    Patient Active " Problem List    Diagnosis Date Noted     Suicidal ideation 12/26/2020     Priority: Medium     Cobalamin deficiency 11/23/2020     Priority: Medium     Epilepsy with partial complex seizures (H) 11/23/2020     Priority: Medium     Obesity 11/23/2020     Priority: Medium     Tobacco user 11/23/2020     Priority: Medium     Vitamin D deficiency 11/23/2020     Priority: Medium     Pseudoseizures (H) 09/13/2018     Priority: Medium     Borderline intellectual functioning 01/10/2017     Priority: Medium     Intermittent explosive disorder 09/05/2015     Priority: Medium     Psychosis (H) 09/04/2015     Priority: Medium     Hypertension 08/30/2009     Priority: Medium     Updated by system to replace inactive record  Updated by system to replace inactive record       Dysphonia 01/30/2009     Priority: Medium     Mixed anxiety depressive disorder 04/20/2006     Priority: Medium     Gastroesophageal reflux disease 04/20/2006     Priority: Medium        Past Medical History:    No past medical history on file.    Past Surgical History:    Past Surgical History:   Procedure Laterality Date     CHOLECYSTECTOMY       HERNIA REPAIR       OTHER SURGICAL HISTORY      pancreatitis       Family History:    Family History   Adopted: Yes   Problem Relation Age of Onset     Diabetes Mother      Seizure Disorder Mother      Hypertension Mother        Social History:  Marital Status:  Single [1]  Social History     Tobacco Use     Smoking status: Former Smoker     Smokeless tobacco: Never Used   Substance Use Topics     Alcohol use: Not Currently        Medications:    calcium carbonate (TUMS) 500 MG chewable tablet  Cyanocobalamin (VITAMIN B-12 PO)  levETIRAcetam (KEPPRA) 750 MG tablet  losartan (COZAAR) 25 MG tablet  omeprazole (PRILOSEC) 20 MG DR capsule  vitamin D2 (ERGOCALCIFEROL) 27512 units (1250 mcg) capsule  vitamin D3 (CHOLECALCIFEROL) 1.25 MG (03620 UT) capsule          Review of Systems  A complete review of systems performed  "and is otherwise negative except as noted in the HPI    Physical Exam   BP: (!) 158/100  Pulse: 114  Temp: (!) 96.3  F (35.7  C)  Resp: 16  Height: 175.3 cm (5' 9\")  Weight: 99.8 kg (220 lb)  SpO2: 96 %    Physical Exam  GEN: Awake, alert, and cooperative. No acute distress.  Resting comfortably on cart  HENT: MMM. External ears and nose normal bilaterally.  EYES: EOM intact. Conjunctiva clear. No discharge.   NECK: Symmetric, freely mobile.   CV : Regular rate and rhythm.  PULM: Normal effort.  Speaking in full sentences with no audible wheezing.  NEURO: Normal speech. Following commands. CN II-XII grossly intact. Answering questions and interacting appropriately.   EXT: No gross deformity. Warm and well perfused.  No abrasions to back of hands.  INT: Warm. No diaphoresis. Normal color.   PSYCH: Normal mood and affect       ED Course        Procedures     Critical Care time:  none               Results for orders placed or performed during the hospital encounter of 09/28/22 (from the past 24 hour(s))   [facility] Draw    Narrative    The following orders were created for panel order [facility] Draw.  Procedure                               Abnormality         Status                     ---------                               -----------         ------                     Extra Blue Top Tube[[id number]]                              Final result               Extra Red Top Tube[[id number]]                               Final result               Extra Green Top (Lithium...[[id number]]                      Final result               Extra Purple Top Tube[[id number]]                            Final result                 Please view results for these tests on the individual orders.   Extra Blue Top Tube   Result Value Ref Range    Hold Specimen JIC    Extra Red Top Tube   Result Value Ref Range    Hold Specimen JIC    Extra Green Top (Lithium Heparin) Tube   Result Value Ref Range    Hold Specimen JIC    Extra Purple Top Tube "   Result Value Ref Range    Hold Specimen JIC        Medications - No data to display    Assessments & Plan (with Medical Decision Making)   47 year old male with past medical history of intermittent explosive disorder, pseudoseizures, epilepsy with partial complex seizures, suicidal ideation, borderline electrical functioning, who comes emergency department with his sister with concern for safety.  Patient relays no concerns.  Denies any thoughts of harming self or others.  Patient does not want to be here.  Noted that can become upset and punches walls.  No signs of recent punching activity based on the lack of abrasions or bruising to the knuckles reassuring vital signs and resting comfortably.  Answering questions appropriately.  I left the room and let patient talk with his sister alone to see what they want to do.  Upon my return he states he just would like to go home.  Denying any somatic complaints.  Recommend he follows up with his primary care team and reiterated to him that he is always welcome to be seen in the emergency department if he ever has any concerns about his mental or physical health that we are here.    I have reviewed the nursing notes.         Discharge Medication List as of 9/28/2022  8:21 PM          Final diagnoses:   Partial symptomatic epilepsy with complex partial seizures, not intractable, without status epilepticus (H)   Mixed anxiety depressive disorder     Joel Winn MD    9/28/2022   [facility] EMERGENCY DEPT    Disclaimer: This note consists of words and symbols derived from keyboarding and dictation using voice recognition software.  As a result, there may be errors that have gone undetected.  Please consider this when interpreting information found in this note.             Joel Winn MD  09/30/22 0624

## 2022-09-29 NOTE — DISCHARGE INSTRUCTIONS
Please follow-up to primary care team if you have any concerns or questions about her medications or treatments.    If you have any thoughts of harming yourself or others, please call 911 come to emergency department immediately.

## 2022-11-02 DIAGNOSIS — G40.209 PARTIAL SYMPTOMATIC EPILEPSY WITH COMPLEX PARTIAL SEIZURES, NOT INTRACTABLE, WITHOUT STATUS EPILEPTICUS (H): ICD-10-CM

## 2022-11-03 RX ORDER — LEVETIRACETAM 750 MG/1
TABLET ORAL
Qty: 360 TABLET | Refills: 1 | Status: SHIPPED | OUTPATIENT
Start: 2022-11-03 | End: 2023-01-25

## 2022-11-03 NOTE — TELEPHONE ENCOUNTER
Refill request for: levETIRAcetam (KEPPRA) 750 MG tablet  Directions: TAKE 2 TABLETS(1500 MG) BY MOUTH TWICE DAILY    LOV: 3/14/22  NOV: Not scheduled- due March 2023    90 day supply with 1 refills Medication T'd for review and signature  Rita Lopes CMA on 11/3/2022 at 12:18 PM

## 2023-01-25 DIAGNOSIS — G40.209 PARTIAL SYMPTOMATIC EPILEPSY WITH COMPLEX PARTIAL SEIZURES, NOT INTRACTABLE, WITHOUT STATUS EPILEPTICUS (H): ICD-10-CM

## 2023-01-25 RX ORDER — LEVETIRACETAM 750 MG/1
TABLET ORAL
Qty: 360 TABLET | Refills: 0 | Status: SHIPPED | OUTPATIENT
Start: 2023-01-25 | End: 2023-02-27

## 2023-01-25 NOTE — TELEPHONE ENCOUNTER
Refill request for: levETIRAcetam (KEPPRA) 750 MG tablet  Directions: TAKE 2 TABLETS(1500 MG) BY MOUTH TWICE DAILY    LOV: 3/14/22  NOV: Not scheduled- Called pt and LM to call and schedule follow iup  Also informed him to contact PMD for his vitamin D and vitamin b12 refills     90 day supply with 0 refills Medication T'd for review and signature  Rita Lopes CMA on 1/25/2023 at 2:42 PM

## 2023-01-25 NOTE — TELEPHONE ENCOUNTER
[facility] Call Center    Phone Message    May a detailed message be left on voicemail: yes     Reason for Call: Medication Refill Request    Has the patient contacted the pharmacy for the refill? Yes   Name of medication being requested:     levETIRAcetam (KEPPRA) 750 MG tablet    vitamin D3 (CHOLECALCIFEROL) 1.25 MG (98634 UT) capsule    Cyanocobalamin (VITAMIN B-12 PO)    Provider who prescribed the medication: Dr. Covarrubias  Pharmacy: [facility] #[id number] [address] AT [facility] [address]  Date medication is needed: asap          Action Taken: Message routed to:  Other: [facility]    Travel Screening: Not Applicable    Dagoberto Herrmann on 1/25/2023 at 1:14 PM  Neurology Intake Representative

## 2023-01-26 ENCOUNTER — TELEPHONE (OUTPATIENT)
Dept: NEUROLOGY | Facility: CLINIC | Age: 48
End: 2023-01-26
Payer: COMMERCIAL

## 2023-01-26 NOTE — TELEPHONE ENCOUNTER
[facility] Call Center    Phone Message    May a detailed message be left on voicemail: yes     Reason for Call: Medication Refill Request    Has the patient contacted the pharmacy for the refill? Yes   Name of medication being requested:  Keppra 750 mg  Vitamin D3 50mg  Vitamin D 12    Provider who prescribed the medication: Satish,   [facility]: [facility] [place]    Date medication is needed: ASAP   Patient request a one year prescription.      Action Taken: Message routed to:  [facility]    Travel Screening: Not Applicable

## 2023-01-26 NOTE — TELEPHONE ENCOUNTER
As noted in yesterdays encounter:    Refill request for: levETIRAcetam (KEPPRA) 750 MG tablet  Directions: TAKE 2 TABLETS(1500 MG) BY MOUTH TWICE DAILY     LOV: 3/14/22  NOV: Not scheduled- Called pt and LM to call and schedule follow iup  Also informed him to contact PMD for his vitamin D and vitamin b12 refills      90 day supply with 0 refills Medication T'd for review and signature

## 2023-02-20 ENCOUNTER — TELEPHONE (OUTPATIENT)
Dept: NEUROLOGY | Facility: CLINIC | Age: 48
End: 2023-02-20
Payer: COMMERCIAL

## 2023-02-20 NOTE — TELEPHONE ENCOUNTER
As mentioned in previous encounters- He needs to request the vitamins (D and b12) from his primary care doctor.   Keppra 90 day supply was sent to [facility] 1/25/23- he needs to schedule a follow up appointment   Patient was contacted twice and no answer. Voicemail was left both times informing him of this.  No medications to send in at this time  Rita Lopes CMA on 2/20/2023 at 1:29 PM

## 2023-02-20 NOTE — TELEPHONE ENCOUNTER
[facility] Call Center    Phone Message    May a detailed message be left on voicemail: yes     Reason for Call: Medication Refill Request    Has the patient contacted the pharmacy for the refill? Yes   Name of medication being requested:   vitamin D3 (CHOLECALCIFEROL) 1.25 MG (95128 UT) capsule  levETIRAcetam (KEPPRA) 750 MG tablet  Cyanocobalamin (VITAMIN B-12 PO)  Provider who prescribed the medication: Dr. Covarrubias  Pharmacy: [facility] #[id number] [address] AT [facility] [address] [facility] [address]  Date medication is needed: asap          Please follow up with patient.  Phone number to reach patient:  Cell number on file:    Telephone Information:   Mobile [phone]       Action Taken: Message routed to: [facility]    Travel Screening: Not Applicable    Dagoberto Herrmann on 2/20/2023 at 1:12 PM  Neurology Intake Representative

## 2023-02-21 NOTE — TELEPHONE ENCOUNTER
[facility] Call Center    Phone Message    May a detailed message be left on voicemail: yes     Reason for Call: Other: patient scheduled follow up with Caitlin in March. Please refill medications below. It looks like the Vitamin D and B12 were filled at [facility] [place] [place] and [place]. Please review.     Action Taken: Message routed to:  Other: neurology    Travel Screening: Not Applicable

## 2023-02-27 DIAGNOSIS — G40.209 PARTIAL SYMPTOMATIC EPILEPSY WITH COMPLEX PARTIAL SEIZURES, NOT INTRACTABLE, WITHOUT STATUS EPILEPTICUS (H): ICD-10-CM

## 2023-02-27 RX ORDER — LEVETIRACETAM 750 MG/1
TABLET ORAL
Qty: 360 TABLET | Refills: 0 | Status: SHIPPED | OUTPATIENT
Start: 2023-02-27 | End: 2023-04-17

## 2023-02-27 NOTE — TELEPHONE ENCOUNTER
Refill request for: levETIRAcetam (KEPPRA) 750 MG tablet  Directions: TAKE 2 TABLETS(1500 MG) BY MOUTH TWICE DAILY    LOV: 3/14/22  NOV: 3/20/23    90 day supply with 0 refills Medication T'd for review and signature  Rita Lopes CMA on 2/27/2023 at 1:23 PM

## 2023-03-22 ENCOUNTER — TELEPHONE (OUTPATIENT)
Dept: NEUROLOGY | Facility: CLINIC | Age: 48
End: 2023-03-22
Payer: COMMERCIAL

## 2023-03-22 NOTE — TELEPHONE ENCOUNTER
Called Joel at [phone]- unable to reach patient as phone will not go through    Please confirm correct # if he calls back.  Also, please let patient know we have tried reaching him many times in the past and have been unable to reach him.   The levetiracetam was sent to [facility] 2/27/23 for a 90 day supply- he should not need a refill right now.   As mentioned several times in the chart- the vitamin b12 and vitamin D need to come from his primary care doctor   He also was a no show for Caitlin Burkett NP 3/20/23 and needs to re-schedule    Rita Lopes CMA on 3/22/2023 at 1:30 PM

## 2023-03-22 NOTE — TELEPHONE ENCOUNTER
[facility] Call Center    Phone Message    May a detailed message be left on voicemail: no     Reason for Call: Medication Refill Request    Has the patient contacted the pharmacy for the refill? Yes   Name of medication being requested:   levETIRAcetam (KEPPRA) 750 MG tablet    Cyanocobalamin (VITAMIN B-12 PO)    vitamin D3 (CHOLECALCIFEROL) 1.25 MG (67994 UT) capsule  beg April    Provider who prescribed the medication: Covarrubias, Hang & unknown providers.   Pharmacy: [facility] #[id number] [address] AT [facility] [address]   Date medication is needed: ASAP     Patient is requesting a refill for the medication listed above. Please call pt back to advise at # [phone].      Action Taken: Message routed to:  Other: [facility]     Travel Screening: Not Applicable

## 2023-04-03 PROBLEM — F44.5 PSYCHOGENIC NONEPILEPTIC SEIZURE: Status: ACTIVE | Noted: 2018-09-13

## 2023-04-12 ENCOUNTER — LAB REQUISITION (OUTPATIENT)
Dept: LAB | Facility: CLINIC | Age: 48
End: 2023-04-12
Payer: COMMERCIAL

## 2023-04-12 DIAGNOSIS — I10 ESSENTIAL (PRIMARY) HYPERTENSION: ICD-10-CM

## 2023-04-12 DIAGNOSIS — E55.9 VITAMIN D DEFICIENCY, UNSPECIFIED: ICD-10-CM

## 2023-04-12 DIAGNOSIS — E78.1 PURE HYPERGLYCERIDEMIA: ICD-10-CM

## 2023-04-12 DIAGNOSIS — R63.4 ABNORMAL WEIGHT LOSS: ICD-10-CM

## 2023-04-12 LAB
ALBUMIN SERPL BCG-MCNC: 4.7 G/DL (ref 3.5–5.2)
ALP SERPL-CCNC: 97 U/L (ref 40–129)
ALT SERPL W P-5'-P-CCNC: 23 U/L (ref 10–50)
ANION GAP SERPL CALCULATED.3IONS-SCNC: 18 MMOL/L (ref 7–15)
AST SERPL W P-5'-P-CCNC: 25 U/L (ref 10–50)
BILIRUB SERPL-MCNC: 0.3 MG/DL
BUN SERPL-MCNC: 17.3 MG/DL (ref 6–20)
CALCIUM SERPL-MCNC: 9.8 MG/DL (ref 8.6–10)
CHLORIDE SERPL-SCNC: 95 MMOL/L (ref 98–107)
CHOLEST SERPL-MCNC: 183 MG/DL
CREAT SERPL-MCNC: 1.25 MG/DL (ref 0.67–1.17)
DEPRECATED HCO3 PLAS-SCNC: 27 MMOL/L (ref 22–29)
ERYTHROCYTE [DISTWIDTH] IN BLOOD BY AUTOMATED COUNT: 12.9 % (ref 10–15)
GFR SERPL CREATININE-BSD FRML MDRD: 71 ML/MIN/1.73M2
GLUCOSE SERPL-MCNC: 104 MG/DL (ref 70–99)
HCT VFR BLD AUTO: 43.1 % (ref 40–53)
HDLC SERPL-MCNC: 33 MG/DL
HGB BLD-MCNC: 13.6 G/DL (ref 13.3–17.7)
LDLC SERPL CALC-MCNC: 92 MG/DL
MCH RBC QN AUTO: 28.7 PG (ref 26.5–33)
MCHC RBC AUTO-ENTMCNC: 31.6 G/DL (ref 31.5–36.5)
MCV RBC AUTO: 91 FL (ref 78–100)
NONHDLC SERPL-MCNC: 150 MG/DL
PLATELET # BLD AUTO: 358 10E3/UL (ref 150–450)
POTASSIUM SERPL-SCNC: 3.2 MMOL/L (ref 3.4–5.3)
PROT SERPL-MCNC: 7.9 G/DL (ref 6.4–8.3)
RBC # BLD AUTO: 4.74 10E6/UL (ref 4.4–5.9)
SODIUM SERPL-SCNC: 140 MMOL/L (ref 136–145)
TRIGL SERPL-MCNC: 288 MG/DL
TSH SERPL DL<=0.005 MIU/L-ACNC: 0.6 UIU/ML (ref 0.3–4.2)
WBC # BLD AUTO: 6.3 10E3/UL (ref 4–11)

## 2023-04-12 PROCEDURE — 85027 COMPLETE CBC AUTOMATED: CPT | Mod: ORL | Performed by: FAMILY MEDICINE

## 2023-04-12 PROCEDURE — 80061 LIPID PANEL: CPT | Mod: ORL | Performed by: FAMILY MEDICINE

## 2023-04-12 PROCEDURE — 84443 ASSAY THYROID STIM HORMONE: CPT | Mod: ORL | Performed by: FAMILY MEDICINE

## 2023-04-12 PROCEDURE — 82306 VITAMIN D 25 HYDROXY: CPT | Mod: ORL | Performed by: FAMILY MEDICINE

## 2023-04-12 PROCEDURE — 80053 COMPREHEN METABOLIC PANEL: CPT | Mod: ORL | Performed by: FAMILY MEDICINE

## 2023-04-13 LAB — DEPRECATED CALCIDIOL+CALCIFEROL SERPL-MC: 94 UG/L (ref 20–75)

## 2023-06-16 ENCOUNTER — TELEPHONE (OUTPATIENT)
Dept: NEUROLOGY | Facility: CLINIC | Age: 48
End: 2023-06-16
Payer: COMMERCIAL

## 2023-06-16 NOTE — TELEPHONE ENCOUNTER
Attempted to return call to patient, he does not have voicemail set up.     Would like detailed information about seizure if he returns call- what did episode look like, how long did it last, any additional episodes?     Nikolay Lemon RN, BSN  [facility]

## 2023-06-16 NOTE — TELEPHONE ENCOUNTER
Health Call Center    Phone Message    May a detailed message be left on voicemail: yes     Reason for Call: Symptoms or Concerns     If patient has red-flag symptoms, warm transfer to triage line    Current symptom or concern: Pt called in to report he had a seizer two days ago    Symptoms have been present for:  2 days ago    Has patient previously been seen for this? Yes    By Dr. Covarrubias        Please follow up with patient.    Phone number to reach patient:  Cell number on file:    Telephone Information:   Mobile [phone]   Mobile [phone]       Action Taken: Message routed to: [facility]    Travel Screening: Not Applicable    Dagoberto Herrmann on 6/16/2023 at 2:21 PM   - Neurology

## 2023-06-19 NOTE — TELEPHONE ENCOUNTER
Attempted second call to patient, no answer or option to leave voicemail.     Nikolay Lemon, RN, BSN  [facility]

## 2023-06-22 NOTE — TELEPHONE ENCOUNTER
Attempted a third call to patient, with no answer or voicemail option; will send letter to attempt to reestablish line of communication with Joel.    Theodore Aguirre RN, BSN  [facility]

## 2023-07-18 ENCOUNTER — TELEPHONE (OUTPATIENT)
Dept: NEUROLOGY | Facility: CLINIC | Age: 48
End: 2023-07-18
Payer: COMMERCIAL

## 2023-07-18 NOTE — TELEPHONE ENCOUNTER
[facility] Call [facility]    Phone Message    May a detailed message be left on voicemail: yes     Reason for Call: Medication Refill Request    Has the patient contacted the pharmacy for the refill? Yes   Name of medication being requested:   levETIRAcetam (KEPPRA) 750 MG tablet    vitamin D3 (CHOLECALCIFEROL) 1.25 MG (61931 UT) capsule    Cyanocobalamin (VITAMIN B-12 PO)    Provider who prescribed the medication: Hang Covarrubias  Pharmacy: [facility] #[id number] [address] AT [facility] [address]  Date medication is needed: ASAP     Pt is requesting a call back to refill these medications. Please call back to advise at # [phone]      Action Taken: Message routed to:  Other: [facility]     Travel Screening: Not Applicable

## 2023-07-18 NOTE — LETTER
July 24, 2023      Joel Gray  [address]  [place] [address]        Dear Joel,     You recently called into our Neurology Clinic about medications and refill requests. We've been unable to reach you by phone. If you have continued concerns call [phone] to reach our triage nurse team.      Sincerely,    [facility]

## 2023-07-19 NOTE — TELEPHONE ENCOUNTER
Called phone number in TE, but unable to reach patient or leave message. Attempted call to home phone number on file, and left message to return call and provide a contact number for patient (have been unable to reach patient in other TE's as well).    Upon call back we can advise patient that levETIRAcetam (KEPPRA) 750 MG tablet has refilled on 4/17, and has additional refills available, please have them contact their pharmacy.    For other medication refill requested listed, patient should contact and discuss with PCP (this has been conveyed to patient historically in multiple TE's).      Theodore Aguirre RN, BSN  [facility]

## 2023-07-21 NOTE — TELEPHONE ENCOUNTER
2nd attempt - Called phone number in TE, but unable to reach patient or leave message.    Theodore Aguirre RN, BSN  [facility]

## 2023-07-24 NOTE — TELEPHONE ENCOUNTER
Unable to reach patient.    Staff has made multiple attempts to reach the patient.  After a 3rd attempt to call and contact the patient, a letter has been composed   and mailed advising patient to reconnect with our clinic to discuss any questions concerns they may have.    Theodore Aguirre RN, BSN  [facility]

## 2023-09-15 NOTE — PROGRESS NOTES
NEUROLOGY FOLLOW UP VISIT  NOTE       [facility]UROLOGY [place]  [address] [place] [address]  Tel: [phone]  Fax: [phone]  [email or web address]     Joel Gary,  1975, MRN [id number]  PCP: Joan Fletcher  Date: 2023      ASSESSMENT & PLAN     Visit Diagnosis  Partial symptomatic epilepsy with complex partial seizures, not intractable, without status epilepticus (H)  Psychogenic nonepileptic seizure     Complex partial seizures  48-year-old male with history of complex partial seizures, pseudoseizures who returns for follow-up.  He had a breakthrough seizure on 3/14/2022 and had an EEG that was unremarkable.  He has both pseudoseizures and real seizures and currently is on Keppra.  I have recommended:    1.  Continue Keppra 1500 mg twice daily prescriptions are filled  2.  Check Keppra level and electrolyte panel  3.  Follow-up in 1 year    Thank you again for this referral, please feel free to contact me if you have any questions.    Hang Covarrubias MD  [facility]  (Formerly, [facility]     HISTORY OF PRESENT ILLNESS     Patient is a 48-year-old male with history of pseudoseizures, complex partial seizures last seen on 3/14/2022 who returns for follow-up.  During his last visit he had a breakthrough seizure and had a repeat EEG that was normal.  His Keppra level was therapeutic and was told to continue on Keppra 750 mg twice daily.  He denies any seizures since his last visit.  He feels current medication is helping.    Patient started having seizures in  and according to his girlfriend he would have shaking at night that sounded more like shivering rather than a seizure. He was admitted to [place] [facility] and had 3 days of monitoring and although EEG was normal he was started on Depakote for mood stabilization. Since that admission he has been seen in the emergency room multiple  times for seizure that were felt to be pseudoseizure. While being on Depakote he developed pancreatitis and was taken off Depakote and switched to Keppra. Initially he had difficulty tolerating Keppra but afterwards adjusted to the dose. He was also evaluated by neuropsychology and was felt to have impaired range of overall intellectual ability and is being followed by psychological services for depression and impulse control disorder.     PROBLEM LIST   Patient Active Problem List   Diagnosis Code    Borderline intellectual functioning R41.83    Mixed anxiety depressive disorder F41.8    Dysphonia R49.0    Gastroesophageal reflux disease K21.9    Hypertension I10    Intermittent explosive disorder F63.81    Pseudoseizures F44.5    Psychosis (H) F29    Cobalamin deficiency E53.8    Epilepsy with partial complex seizures (H) G40.209    Obesity E66.9    Tobacco user Z72.0    Vitamin D deficiency E55.9    Suicidal ideation R45.851         PAST MEDICAL & SURGICAL HISTORY     Past Medical History:   Patient  has no past medical history on file.    Surgical History:  He  has a past surgical history that includes other surgical history; Cholecystectomy; and hernia repair.     SOCIAL HISTORY     Reviewed, and he  reports that he has quit smoking. He has never used smokeless tobacco. He reports that he does not currently use alcohol.     FAMILY HISTORY     Reviewed, and family history includes Diabetes in his mother; Hypertension in his mother; Seizure Disorder in his mother. He was adopted.     ALLERGIES     Allergies   Allergen Reactions    Aripiprazole      Other reaction(s): Agitation  Suicidal and terroristic thoughts    Acetaminophen Other (See Comments)     Seizure      Amoxicillin     Aspirin     Carbamazepine     Citalopram      Other reaction(s): Unknown    Codeine Other (See Comments)     seizure    Dexlansoprazole      Other reaction(s): Unknown    Divalproex Sodium [Valproic Acid] Other (See Comments)      "Pancreatitis      Ibuprofen      Other reaction(s): rash    Iodine Other (See Comments)     Seizure      Morphine     Trazodone      Other reaction(s): Unknown    Diatrizoate Rash    Phenytoin Rash    Topamax [Topiramate] Other (See Comments)     Hallucinations           REVIEW OF SYSTEMS     A 12 point review of system was performed and was negative except as outlined in the history of present illness.     HOME MEDICATIONS     Current Outpatient Rx   Medication Sig Dispense Refill    calcium carbonate (TUMS) 500 MG chewable tablet Take 1-2 tablets by mouth daily as needed      Cyanocobalamin (VITAMIN B-12 PO) Take 1 tablet by mouth 2 times daily      levETIRAcetam (KEPPRA) 750 MG tablet Take 2 tablets (1,500 mg) by mouth 2 times daily TAKE 2 TABLETS BY MOUTH TWICE DAILY 360 tablet 3    losartan (COZAAR) 25 MG tablet 2 tabs in the am, 1 tab in the pm      omeprazole (PRILOSEC) 20 MG DR capsule Take 20 mg by mouth daily      vitamin D2 (ERGOCALCIFEROL) 10202 units (1250 mcg) capsule Take 50,000 Units by mouth once a week On Monday      vitamin D3 (CHOLECALCIFEROL) 1.25 MG (02778 UT) capsule            PHYSICAL EXAM     Vital signs  /79   Pulse 77   Ht 1.753 m (5' 9\")   Wt 78 kg (171 lb 14.4 oz)   BMI 25.39 kg/m      Weight:   171 lbs 14.4 oz    Patient is alert and oriented x4 in no acute distress. Vital signs were reviewed and are documented in electronic medical record. Neck was supple, no carotid bruits, thyromegaly, JVD, or lymphadenopathy was noted.   NEUROLOGY EXAM:   Patient s speech was normal with no aphasia or dysarthria. Mentation, and affect were also normal.    Funduscopic exam was normal, with normal cup to disc ratio. Cranial nerves II -XII were intact.    Patient had normal mass, tone and motor strength was 5/5 in all extremities without pronator drift.    Sensation was intact to light touch, pinprick, and vibratory sensation.    Reflexes were 1+ symmetrical with downgoing toes.    No " dysmetria noted on FNF or HKS. Romberg was negative.   Gait testing was normal. Able to walk on toes/heels. Tandem walk normal.     PERTINENT DIAGNOSTIC STUDIES     Following studies were reviewed:     MRI 9/13/18  1. No acute/subacute infarcts, mass lesions, hydrocephalus or MRI evidence of intracranial hemorrhage. No abnormal intracranial enhancement.  2. Intracranial contents are stable compared to brain MRI 12/26/2014.     EEG 1/10/2022  Normal in wakefulness and drowsiness. Epileptiform discharges or seizures were not seen in this 31 minute recording.    EEG 9/13/18  Impression: This is a normal awake and drowsy EEG     PERTINENT LABS  Following labs were reviewed:  No visits with results within 3 Month(s) from this visit.   Latest known visit with results is:   Lab Requisition on 04/12/2023   Component Date Value Ref Range Status    Vitamin D, Total (25-Hydroxy) 04/12/2023 94 (H)  20 - 75 ug/L Final         Total time spent for face to face visit, reviewing labs/imaging studies, counseling and coordination of care was: 30 Minutes spent on the date of the encounter doing chart review, review of outside records, review of test results, interpretation of tests, patient visit, and documentation     This note was dictated using voice recognition software.  Any grammatical or context distortions are unintentional and inherent to the software.    Orders Placed This Encounter   Procedures    Keppra (Levetiracetam) Level    Electrolyte panel      New Prescriptions    No medications on file     Modified Medications    Modified Medication Previous Medication    LEVETIRACETAM (KEPPRA) 750 MG TABLET levETIRAcetam (KEPPRA) 750 MG tablet       Take 2 tablets (1,500 mg) by mouth 2 times daily TAKE 2 TABLETS BY MOUTH TWICE DAILY    TAKE 2 TABLETS BY MOUTH TWICE DAILY

## 2023-09-18 ENCOUNTER — OFFICE VISIT (OUTPATIENT)
Dept: NEUROLOGY | Facility: CLINIC | Age: 48
End: 2023-09-18
Payer: COMMERCIAL

## 2023-09-18 ENCOUNTER — LAB (OUTPATIENT)
Dept: LAB | Facility: HOSPITAL | Age: 48
End: 2023-09-18
Payer: COMMERCIAL

## 2023-09-18 VITALS
SYSTOLIC BLOOD PRESSURE: 127 MMHG | DIASTOLIC BLOOD PRESSURE: 79 MMHG | HEIGHT: 69 IN | WEIGHT: 171.9 LBS | HEART RATE: 77 BPM | BODY MASS INDEX: 25.46 KG/M2

## 2023-09-18 DIAGNOSIS — G40.209 PARTIAL SYMPTOMATIC EPILEPSY WITH COMPLEX PARTIAL SEIZURES, NOT INTRACTABLE, WITHOUT STATUS EPILEPTICUS (H): Primary | ICD-10-CM

## 2023-09-18 DIAGNOSIS — F44.5 PSYCHOGENIC NONEPILEPTIC SEIZURE: ICD-10-CM

## 2023-09-18 DIAGNOSIS — G40.209 PARTIAL SYMPTOMATIC EPILEPSY WITH COMPLEX PARTIAL SEIZURES, NOT INTRACTABLE, WITHOUT STATUS EPILEPTICUS (H): ICD-10-CM

## 2023-09-18 LAB
ANION GAP SERPL CALCULATED.3IONS-SCNC: 10 MMOL/L (ref 7–15)
CHLORIDE SERPL-SCNC: 94 MMOL/L (ref 98–107)
DEPRECATED HCO3 PLAS-SCNC: 33 MMOL/L (ref 22–29)
LEVETIRACETAM SERPL-MCNC: 42.2 ΜG/ML (ref 10–40)
POTASSIUM SERPL-SCNC: 2.9 MMOL/L (ref 3.4–5.3)
SODIUM SERPL-SCNC: 137 MMOL/L (ref 136–145)

## 2023-09-18 PROCEDURE — 36415 COLL VENOUS BLD VENIPUNCTURE: CPT

## 2023-09-18 PROCEDURE — 80177 DRUG SCRN QUAN LEVETIRACETAM: CPT

## 2023-09-18 PROCEDURE — 80051 ELECTROLYTE PANEL: CPT

## 2023-09-18 PROCEDURE — 99214 OFFICE O/P EST MOD 30 MIN: CPT | Performed by: PSYCHIATRY & NEUROLOGY

## 2023-09-18 RX ORDER — LEVETIRACETAM 750 MG/1
1500 TABLET ORAL 2 TIMES DAILY
Qty: 360 TABLET | Refills: 3 | Status: SHIPPED | OUTPATIENT
Start: 2023-09-18 | End: 2024-08-23

## 2023-09-18 NOTE — NURSING NOTE
Chief Complaint   Patient presents with    Seizures     Follow up     Marcy Caceres on 9/18/2023 at 11:02 AM

## 2023-09-18 NOTE — LETTER
2023         RE: Joel Gray  [address]  [facility] [address]        Dear Colleague,    Thank you for referring your patient, Joel Gray, to the [facility] [place]. Please see a copy of my visit note below.    NEUROLOGY FOLLOW UP VISIT  NOTE       [facility]UROLOG[facility] [place]  [address] [place] [address]  Tel: [phone]  Fax: [phone]  [email or web address]     Joel Gray,  1975, MRN [id number]  PCP: Joan Fletcher  Date: 2023      ASSESSMENT & PLAN     Visit Diagnosis  Partial symptomatic epilepsy with complex partial seizures, not intractable, without status epilepticus (H)  Psychogenic nonepileptic seizure     Complex partial seizures  48-year-old male with history of complex partial seizures, pseudoseizures who returns for follow-up.  He had a breakthrough seizure on 3/14/2022 and had an EEG that was unremarkable.  He has both pseudoseizures and real seizures and currently is on Keppra.  I have recommended:    1.  Continue Keppra 1500 mg twice daily prescriptions are filled  2.  Check Keppra level and electrolyte panel  3.  Follow-up in 1 year    Thank you again for this referral, please feel free to contact me if you have any questions.    Hang Covarrubias MD  [facility] NEUROLOGY, [place]  (Formerly, [facility]     HISTORY OF PRESENT ILLNESS     Patient is a 48-year-old male with history of pseudoseizures, complex partial seizures last seen on 3/14/2022 who returns for follow-up.  During his last visit he had a breakthrough seizure and had a repeat EEG that was normal.  His Keppra level was therapeutic and was told to continue on Keppra 750 mg twice daily.  He denies any seizures since his last visit.  He feels current medication is helping.    Patient started having seizures in  and according to his girlfriend he would have shaking at night that  sounded more like shivering rather than a seizure. He was admitted to [place] [facility] and had 3 days of monitoring and although EEG was normal he was started on Depakote for mood stabilization. Since that admission he has been seen in the emergency room multiple times for seizure that were felt to be pseudoseizure. While being on Depakote he developed pancreatitis and was taken off Depakote and switched to Keppra. Initially he had difficulty tolerating Keppra but afterwards adjusted to the dose. He was also evaluated by neuropsychology and was felt to have impaired range of overall intellectual ability and is being followed by psychological services for depression and impulse control disorder.     PROBLEM LIST   Patient Active Problem List   Diagnosis Code     Borderline intellectual functioning R41.83     Mixed anxiety depressive disorder F41.8     Dysphonia R49.0     Gastroesophageal reflux disease K21.9     Hypertension I10     Intermittent explosive disorder F63.81     Pseudoseizures F44.5     Psychosis (H) F29     Cobalamin deficiency E53.8     Epilepsy with partial complex seizures (H) G40.209     Obesity E66.9     Tobacco user Z72.0     Vitamin D deficiency E55.9     Suicidal ideation R45.851         PAST MEDICAL & SURGICAL HISTORY     Past Medical History:   Patient  has no past medical history on file.    Surgical History:  He  has a past surgical history that includes other surgical history; Cholecystectomy; and hernia repair.     SOCIAL HISTORY     Reviewed, and he  reports that he has quit smoking. He has never used smokeless tobacco. He reports that he does not currently use alcohol.     FAMILY HISTORY     Reviewed, and family history includes Diabetes in his mother; Hypertension in his mother; Seizure Disorder in his mother. He was adopted.     ALLERGIES     Allergies   Allergen Reactions     Aripiprazole      Other reaction(s): Agitation  Suicidal and terroristic thoughts     Acetaminophen  "Other (See Comments)     Seizure       Amoxicillin      Aspirin      Carbamazepine      Citalopram      Other reaction(s): Unknown     Codeine Other (See Comments)     seizure     Dexlansoprazole      Other reaction(s): Unknown     Divalproex Sodium [Valproic Acid] Other (See Comments)     Pancreatitis       Ibuprofen      Other reaction(s): rash     Iodine Other (See Comments)     Seizure       Morphine      Trazodone      Other reaction(s): Unknown     Diatrizoate Rash     Phenytoin Rash     Topamax [Topiramate] Other (See Comments)     Hallucinations           REVIEW OF SYSTEMS     A 12 point review of system was performed and was negative except as outlined in the history of present illness.     HOME MEDICATIONS     Current Outpatient Rx   Medication Sig Dispense Refill     calcium carbonate (TUMS) 500 MG chewable tablet Take 1-2 tablets by mouth daily as needed       Cyanocobalamin (VITAMIN B-12 PO) Take 1 tablet by mouth 2 times daily       levETIRAcetam (KEPPRA) 750 MG tablet Take 2 tablets (1,500 mg) by mouth 2 times daily TAKE 2 TABLETS BY MOUTH TWICE DAILY 360 tablet 3     losartan (COZAAR) 25 MG tablet 2 tabs in the am, 1 tab in the pm       omeprazole (PRILOSEC) 20 MG DR capsule Take 20 mg by mouth daily       vitamin D2 (ERGOCALCIFEROL) 82708 units (1250 mcg) capsule Take 50,000 Units by mouth once a week On Monday       vitamin D3 (CHOLECALCIFEROL) 1.25 MG (78755 UT) capsule            PHYSICAL EXAM     Vital signs  /79   Pulse 77   Ht 1.753 m (5' 9\")   Wt 78 kg (171 lb 14.4 oz)   BMI 25.39 kg/m      Weight:   171 lbs 14.4 oz    Patient is alert and oriented x4 in no acute distress. Vital signs were reviewed and are documented in electronic medical record. Neck was supple, no carotid bruits, thyromegaly, JVD, or lymphadenopathy was noted.   NEUROLOGY EXAM:    Patient s speech was normal with no aphasia or dysarthria. Mentation, and affect were also normal.     Funduscopic exam was normal, " with normal cup to disc ratio. Cranial nerves II -XII were intact.     Patient had normal mass, tone and motor strength was 5/5 in all extremities without pronator drift.     Sensation was intact to light touch, pinprick, and vibratory sensation.     Reflexes were 1+ symmetrical with downgoing toes.     No dysmetria noted on FNF or HKS. Romberg was negative.    Gait testing was normal. Able to walk on toes/heels. Tandem walk normal.     PERTINENT DIAGNOSTIC STUDIES     Following studies were reviewed:     MRI 9/13/18  1. No acute/subacute infarcts, mass lesions, hydrocephalus or MRI evidence of intracranial hemorrhage. No abnormal intracranial enhancement.  2. Intracranial contents are stable compared to brain MRI 12/26/2014.     EEG 1/10/2022  Normal in wakefulness and drowsiness. Epileptiform discharges or seizures were not seen in this 31 minute recording.    EEG 9/13/18  Impression: This is a normal awake and drowsy EEG     PERTINENT LABS  Following labs were reviewed:  No visits with results within 3 Month(s) from this visit.   Latest known visit with results is:   Lab Requisition on 04/12/2023   Component Date Value Ref Range Status     Vitamin D, Total (25-Hydroxy) 04/12/2023 94 (H)  20 - 75 ug/L Final         Total time spent for face to face visit, reviewing labs/imaging studies, counseling and coordination of care was: 30 Minutes spent on the date of the encounter doing chart review, review of outside records, review of test results, interpretation of tests, patient visit, and documentation     This note was dictated using voice recognition software.  Any grammatical or context distortions are unintentional and inherent to the software.    Orders Placed This Encounter   Procedures     Keppra (Levetiracetam) Level     Electrolyte panel      New Prescriptions    No medications on file     Modified Medications    Modified Medication Previous Medication    LEVETIRACETAM (KEPPRA) 750 MG TABLET levETIRAcetam  (KEPPRA) 750 MG tablet       Take 2 tablets (1,500 mg) by mouth 2 times daily TAKE 2 TABLETS BY MOUTH TWICE DAILY    TAKE 2 TABLETS BY MOUTH TWICE DAILY                   Again, thank you for allowing me to participate in the care of your patient.        Sincerely,        Hang Covarrubias MD

## 2023-10-11 ENCOUNTER — TELEPHONE (OUTPATIENT)
Dept: NEUROLOGY | Facility: CLINIC | Age: 48
End: 2023-10-11
Payer: COMMERCIAL

## 2023-10-11 NOTE — TELEPHONE ENCOUNTER
Called Joel and ANIBAL that Keppra was sent to [facility] 9/18/23 for a 90 day supply with 3 refills- he should contact [facility]    As mentioned SEVERAL times in previous TE's-patient should contact primary care doctor for his vitamin b12 and vitamin d3     Outpatient Medication Detail     Disp Refills Start End ZENA   levETIRAcetam (KEPPRA) 750 MG tablet 360 tablet 3 9/18/2023  No   Sig - Route: Take 2 tablets (1,500 mg) by mouth 2 times daily TAKE 2 TABLETS BY MOUTH TWICE DAILY - Oral   Sent to pharmacy as: levETIRAcetam 750 MG Oral Tablet (KEPPRA)   Class: E-Prescribe   Order: [id number]   E-Prescribing Status: Receipt confirmed by pharmacy (9/18/2023 11:15 AM CDT)     Printout Tracking    External Result Report     Medication Administration Instructions    TAKE 2 TABLETS BY MOUTH TWICE DAILY     Pharmacy    [id number] - [place], [place] - [facility] [address] AT [facility] [address]

## 2023-10-11 NOTE — TELEPHONE ENCOUNTER
[facility] Call Center    Phone Message    May a detailed message be left on voicemail: yes     Reason for Call: Medication Refill Request    Has the patient contacted the pharmacy for the refill? Yes   Name of medication being requested: levETIRAcetam (KEPPRA) 750 MG tablet, Vit D, Vit B12  Provider who prescribed the medication: Covarrubias  Pharmacy: [facility]  Date medication is needed:      Patient is requesting medication refills. Please contact patient to discuss at [phone]    Action Taken: Other: [facility]    Travel Screening: Not Applicable

## 2023-12-05 ENCOUNTER — HOSPITAL ENCOUNTER (OUTPATIENT)
Facility: CLINIC | Age: 48
Setting detail: OBSERVATION
Discharge: HOME | End: 2023-12-06
Attending: EMERGENCY MEDICINE | Admitting: EMERGENCY MEDICINE
Payer: COMMERCIAL

## 2023-12-05 DIAGNOSIS — R46.89 AGGRESSIVE BEHAVIOR: ICD-10-CM

## 2023-12-05 DIAGNOSIS — F29 PSYCHOSIS, UNSPECIFIED PSYCHOSIS TYPE (H): Primary | ICD-10-CM

## 2023-12-05 PROCEDURE — 99285 EMERGENCY DEPT VISIT HI MDM: CPT

## 2023-12-05 RX ORDER — LEVETIRACETAM 500 MG/1
1500 TABLET ORAL 2 TIMES DAILY
Status: DISCONTINUED | OUTPATIENT
Start: 2023-12-05 | End: 2023-12-06 | Stop reason: HOSPADM

## 2023-12-05 ASSESSMENT — ACTIVITIES OF DAILY LIVING (ADL)
ADLS_ACUITY_SCORE: 35

## 2023-12-05 NOTE — ED NOTES
Bed: ED10  Expected date:   Expected time:   Means of arrival:   Comments:  [facility]  48y M  , combative  Versed, ketamine given

## 2023-12-05 NOTE — ED PROVIDER NOTES
ED Provider Note  [facility]      History     Chief Complaint   Patient presents with    Aggressive Behavior     HPI  Joel Gray is a 48 year old male with PMH of intermittent explosive disorder, pseudoseizures, epilepsy with partial complex seizures (on Keppra), suicidal ideation, s/p cholecystectomy, s/p hernia repair who presents to the Emergency Department today due to aggressive behaviors.  [facility] and [facility] objects in an apartment common area.  Patient was restrained and given 250 mg of ketamine as well as 3 mg of Versed during transport to the emergency department.  He arrives sedated.        Past Medical History  No past medical history on file.  Past Surgical History:   Procedure Laterality Date    CHOLECYSTECTOMY      HERNIA REPAIR      OTHER SURGICAL HISTORY      pancreatitis     calcium carbonate (TUMS) 500 MG chewable tablet  Cyanocobalamin (VITAMIN B-12 PO)  levETIRAcetam (KEPPRA) 750 MG tablet  losartan (COZAAR) 25 MG tablet  omeprazole (PRILOSEC) 20 MG DR capsule  vitamin D2 (ERGOCALCIFEROL) 03645 units (1250 mcg) capsule  vitamin D3 (CHOLECALCIFEROL) 1.25 MG (35737 UT) capsule      Allergies   Allergen Reactions    Aripiprazole      Other reaction(s): Agitation  Suicidal and terroristic thoughts    Acetaminophen Other (See Comments)     Seizure      Amoxicillin     Aspirin     Carbamazepine     Citalopram      Other reaction(s): Unknown    Codeine Other (See Comments)     seizure    Dexlansoprazole      Other reaction(s): Unknown    Divalproex Sodium [Valproic Acid] Other (See Comments)     Pancreatitis      Ibuprofen      Other reaction(s): rash    Iodine Other (See Comments)     Seizure      Morphine     Trazodone      Other reaction(s): Unknown    Diatrizoate Rash    Phenytoin Rash    Topamax [Topiramate] Other (See Comments)     Hallucinations       Family History  Family History   Adopted: Yes    Problem Relation Age of Onset    Diabetes Mother     Seizure Disorder Mother     Hypertension Mother      Social History   Social History     Tobacco Use    Smoking status: Former    Smokeless tobacco: Never   Substance Use Topics    Alcohol use: Not Currently         A medically appropriate review of systems was performed with pertinent positives and negatives noted in the HPI, and all other systems negative.    Physical Exam   BP: 139/84  Pulse: 80  Temp: 97.4  F (36.3  C)  Resp: 18  SpO2: 97 %  Physical Exam  Vitals and nursing note reviewed.   Constitutional:       General: He is sleeping. He is not in acute distress.     Appearance: He is not diaphoretic.      Comments: Opens eyes briefly to voice then back to sleep.   HENT:      Head: Normocephalic and atraumatic.   Eyes:      General: No scleral icterus.     Pupils: Pupils are equal, round, and reactive to light.   Cardiovascular:      Rate and Rhythm: Normal rate and regular rhythm.      Heart sounds: Normal heart sounds.   Pulmonary:      Effort: No respiratory distress.      Breath sounds: Normal breath sounds.   Abdominal:      General: Bowel sounds are normal.      Palpations: Abdomen is soft.      Tenderness: There is no abdominal tenderness.   Musculoskeletal:         General: No tenderness. Normal range of motion.   Skin:     General: Skin is warm.      Findings: No rash.   Neurological:      General: No focal deficit present.           ED Course, Procedures, & Data      Procedures                      No results found for any visits on 12/05/23.  Medications   levETIRAcetam (KEPPRA) tablet 1,500 mg (has no administration in time range)     Labs Ordered and Resulted from Time of ED Arrival to Time of ED Departure - No data to display  No orders to display          Critical care was not performed.     Medical Decision Making  The patient's presentation was of moderate complexity (a chronic illness mild to moderate exacerbation, progression, or side  effect of treatment).      The patient's evaluation involved:  review of external note(s) from 1 sources ([facility] admission in 2020 for depression and suicide ideation)  ordering and/or review of 1 test(s) in this encounter (see separate area of note for details)            The patient's management necessitated further care after sign-out to Dr. Mayfield (see their note for further management).    Assessment & Plan    48 year old male with history of seizure disorder, depression, suicide ideation, and psychosis to the emergency department after erratic behavior in the common area of his apartment complex today.  He received ketamine and Versed during his transport and is been sleeping.  He awakens and has no acute complaints.  Awaiting DEC assessment and recommendations.  Signed out at change of shift.    I have reviewed the nursing notes. I have reviewed the findings, diagnosis, plan and need for follow up with the patient.    New Prescriptions    No medications on file       Final diagnoses:   Aggressive behavior     Chart documentation was completed with Dragon voice-recognition software. Even though reviewed, this chart may still contain some grammatical, spelling, and word errors.       [facility] EMERGENCY DEPARTMENT  12/5/2023     Pepito Chino MD  12/06/23 0003

## 2023-12-05 NOTE — ED TRIAGE NOTES
Pt biba after being called to pts apartment complex where pt was yelling and throwing stuff in apartment common area. Pt was placed in restraints to transport and was given 250 mg of ketamine and 3 mg total of versed. Upon ed arrival pt has vomited twice. Vitals stable with EMS. Pt not communicating with ED staff but awake on arrival.

## 2023-12-05 NOTE — ED NOTES
Patient remains asleep, arouses to speech-opens eyes but does not respond. Unable to complete triage at this time. Per Dr Ulrich, patient safe to not be on heart monitor

## 2023-12-06 VITALS
TEMPERATURE: 98.8 F | BODY MASS INDEX: 26.92 KG/M2 | DIASTOLIC BLOOD PRESSURE: 81 MMHG | HEART RATE: 87 BPM | RESPIRATION RATE: 19 BRPM | SYSTOLIC BLOOD PRESSURE: 127 MMHG | OXYGEN SATURATION: 99 % | HEIGHT: 67 IN

## 2023-12-06 PROBLEM — R46.89 AGGRESSIVE BEHAVIOR: Status: ACTIVE | Noted: 2023-12-06

## 2023-12-06 PROCEDURE — G0378 HOSPITAL OBSERVATION PER HR: HCPCS

## 2023-12-06 PROCEDURE — 99204 OFFICE O/P NEW MOD 45 MIN: CPT | Performed by: PSYCHIATRY & NEUROLOGY

## 2023-12-06 PROCEDURE — 250N000013 HC RX MED GY IP 250 OP 250 PS 637: Performed by: EMERGENCY MEDICINE

## 2023-12-06 RX ORDER — FENOFIBRATE 54 MG/1
54 TABLET ORAL DAILY
COMMUNITY

## 2023-12-06 RX ORDER — CHOLECALCIFEROL (VITAMIN D3) 1250 MCG
1250 CAPSULE ORAL WEEKLY
COMMUNITY

## 2023-12-06 RX ORDER — HYDROCHLOROTHIAZIDE 25 MG/1
25 TABLET ORAL DAILY
COMMUNITY

## 2023-12-06 RX ORDER — RISPERIDONE 0.5 MG/1
TABLET ORAL
Qty: 90 TABLET | Refills: 0 | Status: SHIPPED | OUTPATIENT
Start: 2023-12-06

## 2023-12-06 RX ORDER — LORATADINE 10 MG/1
10 TABLET ORAL DAILY
COMMUNITY

## 2023-12-06 RX ORDER — LOSARTAN POTASSIUM 50 MG/1
50 TABLET ORAL DAILY
Status: DISCONTINUED | OUTPATIENT
Start: 2023-12-06 | End: 2023-12-06 | Stop reason: HOSPADM

## 2023-12-06 RX ORDER — CELECOXIB 200 MG/1
200 CAPSULE ORAL DAILY
COMMUNITY

## 2023-12-06 RX ORDER — PANTOPRAZOLE SODIUM 40 MG/1
40 TABLET, DELAYED RELEASE ORAL DAILY
Status: DISCONTINUED | OUTPATIENT
Start: 2023-12-06 | End: 2023-12-06 | Stop reason: HOSPADM

## 2023-12-06 RX ADMIN — LEVETIRACETAM 1500 MG: 500 TABLET, FILM COATED ORAL at 10:04

## 2023-12-06 RX ADMIN — LEVETIRACETAM 1500 MG: 500 TABLET, FILM COATED ORAL at 02:39

## 2023-12-06 RX ADMIN — PANTOPRAZOLE SODIUM 40 MG: 40 TABLET, DELAYED RELEASE ORAL at 10:04

## 2023-12-06 ASSESSMENT — ACTIVITIES OF DAILY LIVING (ADL)
ADLS_ACUITY_SCORE: 35

## 2023-12-06 ASSESSMENT — COLUMBIA-SUICIDE SEVERITY RATING SCALE - C-SSRS
1. SINCE LAST CONTACT, HAVE YOU WISHED YOU WERE DEAD OR WISHED YOU COULD GO TO SLEEP AND NOT WAKE UP?: NO
ATTEMPT SINCE LAST CONTACT: NO
TOTAL  NUMBER OF INTERRUPTED ATTEMPTS SINCE LAST CONTACT: NO
SUICIDE, SINCE LAST CONTACT: NO
2. HAVE YOU ACTUALLY HAD ANY THOUGHTS OF KILLING YOURSELF?: NO
6. HAVE YOU EVER DONE ANYTHING, STARTED TO DO ANYTHING, OR PREPARED TO DO ANYTHING TO END YOUR LIFE?: NO
TOTAL  NUMBER OF ABORTED OR SELF INTERRUPTED ATTEMPTS SINCE LAST CONTACT: NO

## 2023-12-06 NOTE — CONSULTS
Diagnostic Evaluation Consultation  Crisis Assessment    Patient Name: Joel Gray  Age:  48 year old  Legal Sex: male  Gender Identity: male  Pronouns:   Race: White  Ethnicity: Not  or   Language: English      Patient was assessed: In person      Patient location: [facility] EMERGENCY DEPARTMENT                             ED14    Referral Data and Chief Complaint  Joel Gray presents to the ED via EMS. Patient is presenting to the ED for the following concerns: Physical aggression, Verbal agitation.   Factors that make the mental health crisis life threatening or complex are:  Pt presents to ED for concerns of aggressive behaviors. Pt was seen destroying chairs and tables in the common area of his apartment complex. Pt states that he had an argument with another individual in the laundry room which caused him to become agitated and aggressive. PT was given several IM injections during transport and placed into room 14. Pt slept for several hours prior to assessment. Pt is remorseful of the situation. Pt is a poor historian and appears to lack insight into his mental health. Chart review and collateral primarly utilized for disposition formulation. Pt denies taking mental health medications at this time. Denies psychotic symptoms, substance use, SI, HI, NSSIB. Pt lives independently at an apartment after he was kicked out of his group home for property destruction. Pt makes comments about having children in [place]. Pt states he has several children in [facility] as well. Per chart review, this is an ongoing chronic delusion that pt experiences. Pt presents with decompensated mental status, but this appears to be near baseline given hx and collateral information..      Informed Consent and Assessment Methods  Explained the crisis assessment process, including applicable information disclosures and limits to confidentiality, assessed understanding of the process, and obtained  consent to proceed with the assessment.  Assessment methods included conducting a formal interview with patient, review of medical records, collaboration with medical staff, and obtaining relevant collateral information from family and community providers when available.  : done     Patient response to interventions: acceptance expressed  Coping skills were attempted to reduce the crisis:  IM injections, speaking with staff     History of the Crisis   Hx of intermittent explosive disorder, psychotic symptoms, mild intellectual disability. Pt has several [facility] admissions in the past. Pt has hx of living in various group homes but has been removed for aggressive behaviors. Pt's family notes psychotic symptoms, primarily paranoia, is experienced at baseline. Pt has no technology in his home and has blankets covering all of his windows. Pts father takes him grocery shopping at the beginning of every month per sisters report. Pt had  and psychiatry in the past but no longer works with these individuals, typically refusing appointments/meetings. Pt has scheduled medications but does not take them. Hx of pseudoseizures. Chart review notes pt has endorsed suicide attempts in the past.    Brief Psychosocial History  Family:  Single, Children yes (pt reportedly has 6 children)  Support System:  Parent(s)  Employment Status:  disabled  Source of Income:  disability  Financial Environmental Concerns:  none  Current Hobbies:   (denies)  Barriers in Personal Life:  behavioral concerns    Significant Clinical History  Current Anxiety Symptoms:   (denies)  Current Depression/Trauma:   (denies)  Current Somatic Symptoms:     Current Psychosis/Thought Disturbance:   (delusions that he has children in [facility] and used to live in [place]. Per family this is baseline.)  Current Eating Symptoms:     Chemical Use History:  Alcohol: None  Benzodiazepines: None  Opiates: None  Cocaine: None  Marijuana: None  Other Use: None   Past  diagnosis:  Depression, Anxiety Disorder (explosive disorder)  Family history:  No known history of mental health or chemical health concerns  Past treatment:  Individual therapy, Case management, Supportive Living Environment (group home, residential house, etc), Psychiatric Medication Management, Inpatient Hospitalization  Details of most recent treatment:  None recently  Other relevant history:          Collateral Information  Is there collateral information: Yes     Collateral information name, relationship, phone number:  [phone], sister, Jen    What happened today: I wasn't aware of what happened.     What is different about patient's functioning: This sounds pretty typical of Joel. He has been removed from several placements because of his behaviors. He typically has psychotic like symptoms such as believing he is from [place] or [facility]. He is supposed to take medications but he typically refuses and he sabotages relationships with MH care workers. He is always paranoid, he has blankets covering his windows in his apartment and he cuts the chords to anything technology related.     Concern about alcohol/drug use:      What do you think the patient needs:      Has patient made comments about wanting to kill themselves/others: no    If d/c is recommended, can they take part in safety/aftercare planning:  yes    Additional collateral information:  Letitia is able to pick pt up and return him to his home if he discharges.     Risk Assessment  [place] Suicide Severity Rating Scale Full Clinical Version:  Suicidal Ideation  Q1 Wish to be Dead (Lifetime): No  Q2 Non-Specific Active Suicidal Thoughts (Lifetime): No     Suicidal Behavior (Lifetime)  Actual Attempt (Lifetime): No  Has subject engaged in non-suicidal self-injurious behavior? (Lifetime): No  Interrupted Attempts (Lifetime): No  Aborted or Self-Interrupted Attempt (Lifetime): No  Preparatory Acts or Behavior (Lifetime): No    [place] Suicide Severity  Rating Scale Recent:              Environmental or Psychosocial Events: impulsivity/recklessness, other life stressors  Protective Factors: Protective Factors: strong bond to family unit, community support, or employment, lives in a responsibly safe and stable environment, able to access care without barriers    Does the patient have thoughts of harming others? Feels Like Hurting Others: no  Previous Attempt to Hurt Others: yes  Violence Threats in Past 6 Months: yes, towards several individuals including police, strangers, and MH workers  Current Violence Plan or Thoughts: denies  Is the patient engaging in sexually inappropriate behavior?: no, but patient has a history  History of inappropriate sexual behavior: touching/grabbing women in public  Duty to warn initiated: no    Is the patient engaging in sexually inappropriate behavior?  no, but patient has a history History of inappropriate sexual behavior: touching/grabbing women in public      Mental Status Exam   Affect: Flat  Appearance: Appropriate  Attention Span/Concentration: Attentive  Eye Contact: Intense    Fund of Knowledge: Delayed   Language /Speech Content: Non-Fluent  Language /Speech Volume: Soft  Language /Speech Rate/Productions: Minimally Responsive  Recent Memory: Poor  Remote Memory: Poor  Mood: Normal  Orientation to Person: Yes   Orientation to Place: Yes  Orientation to Time of Day: Yes  Orientation to Date: Yes     Situation (Do they understand why they are here?): Yes  Psychomotor Behavior: Underactive  Thought Content: Clear  Thought Form: Paranoia        Medication  None       Current Care Team  Patient Care Team:  Joan Fletcher MD as PCP - General (Family Practice)  Caitlin Burkett APRN CNP as Nurse Practitioner (Neurology)  Hang Covarrubias MD as Assigned Neuroscience Provider    Diagnosis  Patient Active Problem List   Diagnosis Code    Borderline intellectual functioning R41.83    Mixed anxiety depressive disorder F41.8    Dysphonia  R49.0    Gastroesophageal reflux disease K21.9    Hypertension I10    Intermittent explosive disorder F63.81    Pseudoseizures F44.5    Psychosis (H) F29    Cobalamin deficiency E53.8    Epilepsy with partial complex seizures (H) G40.209    Obesity E66.9    Tobacco user Z72.0    Vitamin D deficiency E55.9    Suicidal ideation R45.851       Primary Problem This Admission  Active Hospital Problems    Borderline intellectual functioning      *Intermittent explosive disorder        Clinical Summary and Substantiation of Recommendations   Pt presents to ED for aggressive behaviors and property destruction in common area of apartment complex. Pt aggressive with EMS and given IM injections for safety and stabilization. In ED, pt exhibiting some paranoid and delusional thinking. Pt states he is from [place] and has 6 children in [facility]. Per chart review, pt has expressed this delusion for several years. Sister states that pt is likely close to his baseline but does not have prescribed medications. Pt does not follow through with OP psychiatry because he does not leave his apartment often and has no access to technology that could allow him to complete a telehealth visit. It is currently recommended to place pt in observational status and allow him to receive a psychiatric consult. Pt is open to this. Pt's sister Jen believes this is a positive plan. Jen notes that when pt is up for discharge, she is able to come and pick him up. Edenilson phone number is [phone].                          Patient coping skills attempted to reduce the crisis:  IM injections, speaking with staff    Disposition  Recommended disposition: Observation        Reviewed case and recommendations with attending provider. Attending Name: Dr. Mayfield       Attending concurs with disposition: yes       Patient and/or validated legal guardian concurs with disposition:   yes       Final disposition:  observation    Legal status on admission:  Voluntary/Patient has signed consent for treatment    Assessment Details   Total duration spent with the patient: 35 min     CPT code(s) utilized: 17748 - Psychotherapy for Crisis - 60 (30-74*) min    PAT Lofton, Psychotherapist  DEC - Triage & Transition Services  Callback: [phone]

## 2023-12-06 NOTE — ED PROVIDER NOTES
"ED Observation Discharge Summary  [facility]  Discharge Date: 12/6/2023    Joel Gray MRN: [id number]   Age: 48 year old YOB: 1975     Brief HPI & Initial ED Course     Chief Complaint   Patient presents with    Aggressive Behavior     HPI  Joel Gray is a 48 year old male with PMH notable for intermittent explosive disorders, pseudoseizures, epilepsy, suicide ideation, psychosis, fixed delusions who presented to the ED with a psychiatric concern.  Patient required ketamine and Versed during transport to the emergency department and was initially quite sedate on his arrival.  He has ultimately undergone DEC assessment.  He was placed on observation status and seen by psychiatry today.  He will start Risperdal.    The patient was evaluated in the emergency department by a physician and DEC behavioral health . The DEC  recommended observation and psychiatry consult. See separate DEC note from this encounter for details on the assessment. The patient's psychiatric state was such that he would benefit from ongoing monitoring. Observation care was initiated with the plan including serial assessments of psychiatric condition, potential administration of medications if indicated, and further disposition pending the patient's psychiatric course during the monitoring period.     See ED Observation H&P for further details on the patient's presenting history and initial evaluation.     Physical Exam   BP: 139/84  Pulse: 80  Temp: 97.4  F (36.3  C)  Resp: 18  Height: 170.2 cm (5' 7\")  SpO2: 97 %    Physical Exam  General: . Appears stated age.   HENT: MMM, no oropharyngeal lesions  Eyes: PERRL, normal sclerae   Cardio: Regular rate, extremities well perfused  Resp: Normal work of breathing, normal respiratory rate  Neuro: alert and fully oriented. CN II-XII grossly intact. Grossly normal strength and sensation in all extremities.   MSK: no deformities. "   Integumentary/Skin: no rash visualized, normal color  Psych: Normal affect, cooperative behavior.  No SI.  No HI.  No hallucinations. Thought process stable fixed delusion. Insight fair.     Results      Procedures           Patient seen by psychiatry.  Recommended restarting Risperdal which patient is in agreement with.       Labs Ordered and Resulted from Time of ED Arrival to Time of ED Departure - No data to display         Observation Course   The patient was found to have a psychiatric condition that would benefit from an observation stay in the emergency department for further psychiatric stabilization and/or coordination of a safe disposition. The plan upon observation admission included serial assessments of psychiatric condition, potential administration of medications if indicated, further disposition pending the patient's psychiatric course during the monitoring period.     Serial assessments of the patient's psychiatric condition were performed. Nursing notes were reviewed. During the observation period, the patient did not require medications for agitation, and did not require restraints/seclusion for patient and/or provider safety.       After the period in observation care, the patient's circumstances and mental state were safe for outpatient management. After counseling on the diagnosis, work-up, and treatment plan, the patient was discharged. Close follow-up with a psychiatrist and/or therapist was recommended and community psychiatric resources were provided. Patient is to return to the ED if any urgent or potentially life-threatening concerns.      Discharge Diagnoses:   Final diagnoses:   Aggressive behavior     Chart documentation was completed with Dragon voice-recognition software. Even though reviewed, this chart may still contain some grammatical, spelling, and word errors.   --  PEPITO COYNE MD, MD  [facility] located [facility] EMERGENCY DEPARTMENT  12/6/2023      Pepito Coyne MD  12/06/23  [id number]

## 2023-12-06 NOTE — PLAN OF CARE
Joel Gray  December 6, 2023  Plan of Care Hand-off Note     Patient Care Path: observation    Plan for Care:   Pt presents to ED for aggressive behaviors and property destruction in common area of apartment complex. Pt aggressive with EMS and given IM injections for safety and stabilization. In ED, pt exhibiting some paranoid and delusional thinking. Pt states he is from [place] and has 6 children in [facility]. Per chart review, pt has expressed this delusion for several years. Sister states that pt is likely close to his baseline but does not have prescribed medications. Pt does not follow through with OP psychiatry because he does not leave his apartment often and has no access to technology that could allow him to complete a telehealth visit. It is currently recommended to place pt in observational status and allow him to receive a psychiatric consult. Pt is open to this. Pt's sister Jen believes this is a positive plan. Jen notes that when pt is up for discharge, she is able to come and pick him up. Edenilson phone number is [phone].    Identified Goals and Safety Issues: receive psychiatric consult, reassess for safety returning home    Overview:  [phone], sisterJen            Legal Status: Legal Status at Admission: Voluntary/Patient has signed consent for treatment    Psychiatry Consult: ordered       Updated  rn, attending regarding plan of care.           PAT Lofton

## 2023-12-06 NOTE — ED NOTES
The pt was accepted at shift change sign out pending a DEC eval.    Pt was doing his laundry today when he had a disagreement with someone else in the apt building. He ran out of the laundry room and started throwing things and breaking things in the common area because he was upset. No SI/HI. He feels remorseful. He is requesting discharge.     The  notes that he does have a history of intermittent explosive disorder.  He was able to reach the patient's sister who states that the patient is at his baseline-but it seems that his baseline is pretty decompensated.  He has blankets over his windows.  He seems chronically paranoid.  She believes he is off his meds, the patient does tell us he does not take meds.  The paperwork that came with him, including the transfer hole, indicated that he reported that he was from [place] and he was going to go back home.  He was also yelling that he was going to die, and asked the police to shoot him.  Again, although it seems like he is at his general baseline, it seems that his general baseline is fairly decompensated.  We will obtain a psychiatry consult for further recommendations.     Dictation Disclaimer: Some of this Note has been completed with voice-recognition dictation software. Although errors are generally corrected real-time, there is the potential for a rare error to be present in the completed chart.       Juliette Mayfield MD  12/06/23 0693

## 2023-12-06 NOTE — PHARMACY-ADMISSION MEDICATION HISTORY
Pharmacist Admission Medication History    Admission medication history is complete. The information provided in this note is only as accurate as the sources available at the time of the update.    Information Source(s): Patient via in-person    Pertinent Information:   - Patient is a poor/moderate historian. He was knowledgeable about some meds (confirmed keppra, losartan doses/frequencies), but then seemed to get other medications mixed up (I.e. states he takes vitamin B12 weekly, vitamin D daily), Patient confirms he gets all his medications from [facility] and has a very consistent fill history. Updated using fill history     Changes made to PTA medication list:  Added: celecoxib, fenofibrate, hydrochlorothiazide, loratadine   Deleted: tums PRN  Changed:   Losartan 50 mg QAM + 25 mg at bedtime -> 50 mg BID     Medication Affordability:       Allergies reviewed with patient and updates made in EHR: yes    Medication History Completed By: RAMA GOLD RPH 12/6/2023 12:05 PM    PTA Med List   Medication Sig Last Dose    celecoxib (CELEBREX) 200 MG capsule Take 200 mg by mouth daily 12/5/2023    cholecalciferol (VITAMIN D3) 1250 mcg (46488 units) capsule Take 1,250 mcg by mouth once a week On Fridays Past Week    Cyanocobalamin (VITAMIN B-12 PO) Take 1 tablet by mouth daily 12/5/2023    fenofibrate (LOFIBRA) 54 MG tablet Take 54 mg by mouth daily 12/5/2023    hydrochlorothiazide (HYDRODIURIL) 25 MG tablet Take 25 mg by mouth daily 12/5/2023    levETIRAcetam (KEPPRA) 750 MG tablet Take 2 tablets (1,500 mg) by mouth 2 times daily TAKE 2 TABLETS BY MOUTH TWICE DAILY 12/5/2023    loratadine (CLARITIN) 10 MG tablet Take 10 mg by mouth daily 12/5/2023    losartan (COZAAR) 50 MG tablet Take 50 mg by mouth 2 times daily 12/5/2023    omeprazole (PRILOSEC) 20 MG DR capsule Take 20 mg by mouth daily 12/5/2023

## 2023-12-06 NOTE — CONSULTS
"[facility]  Psychiatry Consultation      Patient name: Joel Gray   MRN: [id number]    Age: 48 year old    YOB: 1975    Identifying information:   The patient is a 48 year old White male who is currently in the emergency department.    Reason for consult: Recent episode of agitation at his living residence.  Concern for underlying psychosis with no active psychotropic medications or outpatient mental health providers.  Consult was requested to help coordinate mental health treatments and comment on disposition.    History of present illness:   The patient has a history of mild intellectual disability, intermittent explosive disorder, and unspecified psychosis.  He was brought to the emergency department by EMS who were called to his residence to evaluate an episode of agitation during which time the patient was reported to be throwing items and appearing angry.  He had received ketamine and route to address his agitation.  Thus far in the emergency department, he has been calm and cooperative with staff.  On examination today, the patient was resting comfortably in his recliner.  He engaged appropriately in the interview although initially appeared slightly guarded.  As we reviewed the recent episode of agitation, he explains \"I was just having a bad day.\"  He did not care to divulge additional details regarding psychosocial stressors or other circumstances which may have been contributory.  He admits that he has often struggled with managing emotional outbursts and episodic anger.  He recalls taking medication in the past which was helpful for some time before he eventually discontinued it as he did not feel it was necessary to continue taking it.  As we reviewed a few medication names, he identified Risperdal as being that medication.  Reflecting on his mood over the past few weeks, he feels as though restarting the medication would be helpful to him and would like to do so.  He " denied symptoms of psychosis although referenced a few times a desire to return to [facility].  Collateral information on file indicates this to be a chronic delusion.  The patient denied auditory and visual hallucinations.  He is not aware of any specific mental health diagnoses which have been established for him.  He is not currently seeing outpatient mental health providers, although notes having seen therapists in the past without benefit.  He denied suicidal and homicidal thoughts.  He denied illicit substance usage.  He denied alcohol use other than 2 or 3 times a year at social settings.  He is comfortable returning home today.    Psychiatric Review of Systems:    -- Depressive episode: Denied symptoms  -- Carmina:  denies symptoms  -- Psychosis:  denies symptoms although there does appear to be a chronic delusion involving his residence or association with countries overseas  -- Anxiety: denies symptoms corresponding to ABDIRAHMAN or panic disorder  -- PTSD: denies symptoms  -- OCD: denies  symptoms  -- Eating disorder: denies symptoms    Psychiatric History:    Past diagnoses noted in the electronic medical records include unspecified psychosis, mild intellectual disability, and intermittent explosive disorder.  Records indicate multiple prior psychiatric hospitalizations, most recent may have occurred in 2020.  Documentation also indicates 1 prior suicide attempt in 2018.  Past medication trials have included Risperdal with good response, Invega which was discontinued due to affordability, Haldol with unknown reaction, and Abilify with a negative adverse effect.  Records indicate a history of civil commitment although does not appear to be active at the moment.  He currently does not have outpatient mental health providers.    Substance Use History:    Denies using illicit substances or alcohol corresponding to a diagnosis of abuse or dependence. No prior chemical dependency treatments reported.    Medical History:   Refer to the internal medicine notes which I reviewed.   No past medical history on file.     Medications:    Current Facility-Administered Medications:     levETIRAcetam (KEPPRA) tablet 1,500 mg, 1,500 mg, Oral, BID, Pepito Chino MD, 1,500 mg at 12/06/23 1004    losartan (COZAAR) tablet 50 mg, 50 mg, Oral, Daily, Pepito Chino MD    pantoprazole (PROTONIX) EC tablet 40 mg, 40 mg, Oral, Daily, Pepito Chino MD, 40 mg at 12/06/23 1004    Current Outpatient Medications:     celecoxib (CELEBREX) 200 MG capsule, Take 200 mg by mouth daily, Disp: , Rfl:     cholecalciferol (VITAMIN D3) 1250 mcg (56150 units) capsule, Take 1,250 mcg by mouth once a week On Fridays, Disp: , Rfl:     Cyanocobalamin (VITAMIN B-12 PO), Take 1 tablet by mouth daily, Disp: , Rfl:     fenofibrate (LOFIBRA) 54 MG tablet, Take 54 mg by mouth daily, Disp: , Rfl:     hydrochlorothiazide (HYDRODIURIL) 25 MG tablet, Take 25 mg by mouth daily, Disp: , Rfl:     levETIRAcetam (KEPPRA) 750 MG tablet, Take 2 tablets (1,500 mg) by mouth 2 times daily TAKE 2 TABLETS BY MOUTH TWICE DAILY, Disp: 360 tablet, Rfl: 3    loratadine (CLARITIN) 10 MG tablet, Take 10 mg by mouth daily, Disp: , Rfl:     losartan (COZAAR) 50 MG tablet, Take 50 mg by mouth 2 times daily, Disp: , Rfl:     omeprazole (PRILOSEC) 20 MG DR capsule, Take 20 mg by mouth daily, Disp: , Rfl:     risperiDONE (RISPERDAL) 0.5 MG tablet, Take 1 tablet (0.5 mg) by mouth every morning AND 2 tablets (1 mg) at bedtime., Disp: 90 tablet, Rfl: 0     Social History:  Refer to the psychosocial assessment completed by the .  Social History     Social History Narrative    Not on file         Family History:    Family History   Adopted: Yes   Problem Relation Age of Onset    Diabetes Mother     Seizure Disorder Mother     Hypertension Mother        Vital Signs:    B/P: 133/84, T: 97.3, P: 87, R: 16  Estimated body mass index is 26.92 kg/m  as calculated from the following:    Height as of this  "encounter: 1.702 m (5' 7\").    Weight as of 9/18/23: 78 kg (171 lb 14.4 oz).       Mental status examination:  Appearance:  Alert, fair hygiene, no acute distress  Attitude: Initially guarded however appeared more comfortable as the interview progressed.  Eye Contact: Fair  Mood: \"Better\"  Affect: Mood congruent and appropriate.  Speech: Slightly pushed  Psychomotor Behavior: Difficult to gauge as he was in a lying position and covered with a blanket however he seemed to demonstrate occasional and mild choreiform movements in his trunk.  He would occasionally tighten his lips or have a one-sided grimace.  He did not seem bothered by this and did not identify this as a new issue.  Thought Process: Linear and mostly logical, except when referencing his residence in a different country; not tangential or circumstantial or disorganized  Associations:  Logical; no loose associations noted  Thought Content: Mild paranoia could be considered given his guarded demeanor and some discussion regarding the government and overseas interests.  He did not appear to be responding to psychotic stimuli.  Denies suicidal Ideations. Denies homicidal ideations.  Insight: Partial  Judgment:  Fair  Oriented to:  time, person, and place  Attention Span and Concentration:  Intact  Recent and Remote Memory: Intact based on interviewing and details provided  Language: Appropriate based on interviewing  Fund of Knowledge: Appropriate based on interviewing  Muscle Strength and Tone: Normal upon visual inspection     Diagnoses:    Unspecified psychosis  Intermittent explosive disorder  Mild intellectual disability     Recommendations:  Restart Risperdal 0.5 mg daily and 1 mg at bedtime to aid in managing symptoms of an intermittent explosive disorder and/or underlying psychosis which may have been contributory to his recent episode of agitation.  A prescription has been sent to his outpatient pharmacy.  Risks and benefits were reviewed.  "   -Chart review does not indicate a history of tardive dyskinesia however he did demonstrate some facial grimacing and possible mild choreiform movements on exam today.  He should establish with an outpatient psychiatrist for further evaluation.  Records do indicate occasional evaluations by his outpatient neurologist although mostly focused on managing his seizure disorder.  -Referral for outpatient psychiatric medication management appointments  -The patient is not interested in utilizing individual psychotherapy  -The patient currently does not meet criteria for psychiatric hospitalization  -Discharge home today.      At the time of discharge, the patient's acute suicide risk was determined to be low due to the following factors: Reduction in the intensity of mood/anxiety symptoms that preceded the admission, denial of suicidal thoughts, denies feeling helpless or helpless, not currently under the influence of alcohol or illicit substances, denies experiencing command hallucinations, no immediate access to firearms. The patient's acute risk could be higher if noncompliant with their treatment plan, medications, follow-up appointments or using illicit substances or alcohol. Protective factors include: social supports, stable housing      Please reconsult with psychiatry as needed.   Rohan Tay MD   Text Page

## 2023-12-06 NOTE — PROGRESS NOTES
"Triage and Transition Services Extended Care Reassessment     Patient: Joel goes by \"Joel,\" uses he/him pronouns  Date of Service: December 6, 2023  Site of Service: [facility] EMERGENCY DEPARTMENT                               Patient was seen yes  Mode of Assessment: In person - [facility] ED 14     Reason for Reassessment: for assessing safety and support with disposition planning.    History of Patient's Original Emergency Room Encounter: Hx of intermittent explosive disorder, psychotic symptoms, mild intellectual disability. Pt has several [facility] admissions in the past. Pt has hx of living in various group homes but has been removed for aggressive behaviors. Pt's family notes psychotic symptoms, primarily paranoia, is experienced at baseline. Pt has no technology in his home and has blankets covering all of his windows. Pt's father takes him grocery shopping at the beginning of every month per sisters report. Pt had  and psychiatry in the past but no longer works with these individuals, typically refusing appointments/meetings. Pt has scheduled medications but does not take them. Hx of pseudoseizures. Chart review notes pt has endorsed suicide attempts in the past.    Current Patient Presentation: Patient is alert and orientated x4. Patient was able to engage and track conversation appropriately. Patient engagement was calm and cooperative. Patient reports he had a \"bad day\" yesterday and escalated beyond what he should. Patient reports he is feeling significantly better today and stated \"not angry at all\". He contributes changes to taking medications and sleeping well last night. Patient denies SI/HI/NSSIB. Patient denies any history of attempts or ideation. Patient denies symptoms of psychosis. Patient presents with some delusional thought process pertaining to going to [employer] and having family there however, per EMR this is present at baseline. 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 Patient denies wanting or needing additional outpatient support. He reports he has had them in the past and has always been discharge from care for being too independent. Patient reports his primary care provider and neurologist is all the care he needs. Patient reports he takes his medication as needed.      Changes Observed Since Initial Assessment: decrease in presenting symptoms    Therapeutic Interventions Provided: Engaged in safety planning    Current Symptoms:   irritable   anger, agitation      Mental Status Exam   Affect: Flat  Appearance: Appropriate  Attention Span/Concentration: Attentive  Eye Contact: Engaged    Fund of Knowledge: Appropriate   Language /Speech Content: Fluent  Language /Speech Volume: Normal  Language /Speech Rate/Productions: Normal  Recent Memory: Intact  Remote Memory: Intact  Mood: Normal  Orientation to Person: Yes   Orientation to Place: Yes  Orientation to Time of Day: Yes  Orientation to Date: Yes     Situation (Do they understand why they are here?): Yes  Psychomotor Behavior: Normal  Thought Content: Delusions  Thought Form:  (At baseline)    Treatment Objective(s) Addressed: rapport building, assessing safety, identifying additional supports, exploring obstacles to safety in the community, safety planning, identifying an appropriate aftercare plan    Patient Response to Interventions: acceptance expressed    Progress Towards Goals:  Patient Reports Symptoms Are: stable  Patient Progress Toward Goals: is making progress  Comment: Patient appears to be at baseline    Case Management: Case Management Included: collaborating with patient's support system  Details on Collaborating with Patient's Support System: Jen (sister)  Summary of Interaction: Reviewed plan of care. Jen voiced understanding and reports she will come at 3:30pm to  patient.    C-SSRS  Since Last Contact:   1. Wish to be Dead (Since Last Contact): No  2. Non-Specific Active Suicidal Thoughts (Since Last Contact): No     Actual Attempt (Since Last Contact): No  Has subject engaged in non-suicidal self-injurious behavior? (Since Last Contact): No  Interrupted Attempts (Since Last Contact): No  Aborted or Self-Interrupted Attempt (Since Last Contact): No  Preparatory Acts or Behavior (Since Last Contact): No  Suicide (Since Last Contact): No     Calculated C-SSRS Risk Score (Since Last Contact): No Risk Indicated    Plan: Discharge  Clinical Substantiation: After brief therapeutic intervention, observation, and aftercare planning by Extended Care and in consultation with attending provider and psychiatry consult, the patient's initial crisis appears to have resolved and patients current mental state is appropriate for outpatient management. Although pt continues to present with some delusional and paranoid thinking patient appears to be at or near baseline and does not appear to be in imminent danger to himself or others.  It is the recommendation of this clinician that pt discharge with OP MH support. At this time the pt is not presenting as an acute risk to self or others due to the following factors: Denied SI/HI/NSSIB, able to contract for safety, willing and wanting to discharge home, willing and wanting to engage in outpatient care. Patient declines support scheduling outpatient appointments reporting he has all the support he needs through his primary provider and neurologist. Patient was open to being provided with resources for outpatient services.    Plan for Care reviewed with assigned Medical Provider: yes- Dr. Chino  Plan for Care Team Review: RN and psychiatry consult provider Dr. Tay    Session Status: Time session started: 1120  Time session ended: 1140  Session Duration (minutes): 20 minutes  Session Number: 1  Anticipated number of sessions or this episode of care: 1    Session  Start Time: 1120  Session Stop Time: 1140  CPT codes: 88633 - Psychotherapy (with patient) - 30 (16-37*) min  Time Spent: 20 minutes      CPT code(s) utilized: 95238 - Psychotherapy (with patient) - 30 (16-37*) min    Diagnosis:   Patient Active Problem List   Diagnosis Code    Borderline intellectual functioning R41.83    Mixed anxiety depressive disorder F41.8    Dysphonia R49.0    Gastroesophageal reflux disease K21.9    Hypertension I10    Intermittent explosive disorder F63.81    Pseudoseizures F44.5    Psychosis (H) F29    Cobalamin deficiency E53.8    Epilepsy with partial complex seizures (H) G40.209    Obesity E66.9    Tobacco user Z72.0    Vitamin D deficiency E55.9    Suicidal ideation R45.851    Aggressive behavior R46.89       Primary Problem This Admission: Active Hospital Problems    Aggressive behavior      Borderline intellectual functioning      *Intermittent explosive disorder        ADELINE Aggarwal   Licensed Mental Health Professional (LMHP), [facility]  [phone]

## 2023-12-06 NOTE — DISCHARGE INSTRUCTIONS
Today you were seen by a licensed mental health professional through Triage and Transition services, [facility] ([facility])  for a crisis assessment in the Emergency Department at [facility].  It is recommended that you follow up with your established providers (psychiatrist, mental health therapist, and/or primary care doctor - as relevant) as soon as possible. Coordinators from [facility] will be calling you in the next 24-48 hours to ensure that you have the resources you need.  You can also contact [facility] coordinators directly at [phone].     You were seen by a psychiatry consult provider today with recommendation for: Restart Risperdal 0.5 mg daily and 1 mg at bedtime to aid in managing symptoms of an intermittent explosive disorder and/or underlying psychosis which may have been contributory to his recent episode of agitation.  A prescription has been sent to his outpatient pharmacy.     Resources for outpatient therapy/psychiatry support:    [facility] provides therapy, psychiatry, and community-based programs to help adults, children, couples, and families struggling with their mental health.  [phone]    [facility] Phone (free remote counseling): [phone] Web address:  [email or web address]     [email or web address] (filter for insurance, gender preference, etc.)     [facility]  [phone]  Intake appointment will be virtual, following appointments can be in person or virtual.  **IMMEDIATE OPENINGS**     [facility]  [phone]  *offers individual therapy, medication management and Mental Health Case Workers; can self refer     [facility]  [phone]  *Immediate Openings

## 2023-12-06 NOTE — ED NOTES
Patient had been sleeping since the start of this shift. He woke up around 0220 to use the bathroom. Patient was encouraged to provide urine for analysis next time he will use the bathroom. Patient agreed. Patient was pleasant and cooperative during the Triage. He said he was brought to ED because he had a tantrum. He told this writer that he's ready for DEC assessment. Charge nurse was informed. Patient said the last time he ate was yesterday morning. Snacks was provided.

## 2024-01-11 ENCOUNTER — TELEPHONE (OUTPATIENT)
Dept: NEUROLOGY | Facility: CLINIC | Age: 49
End: 2024-01-11
Payer: COMMERCIAL

## 2024-01-11 NOTE — TELEPHONE ENCOUNTER
Health Call Center    Phone Message    May a detailed message be left on voicemail: yes     Reason for Call: Medication Refill Request    Has the patient contacted the pharmacy for the refill? Yes   Name of medication being requested: levETIRAcetam (KEPPRA) 750 MG tablet   And  VITAMIN B-12   And  VITAMIN D3  Provider who prescribed the medication: Dr. Covarrubias, per patient  Pharmacy: [facility] #[id number] [address] AT [facility] [address]   Date medication is needed:     Action Taken: Message routed to:  Other: [facility]    Travel Screening: Not Applicable

## 2024-01-11 NOTE — TELEPHONE ENCOUNTER
Fax sent to [facility] on file that patient is looking for a Keppra refill and to use refills on file.  Per previous notes:  As mentioned several times in previous TE's-patient should contact primary care doctor for his vitamin b12 and vitamin d3      Outpatient Medication Detail     Disp Refills Start End ZENA   levETIRAcetam (KEPPRA) 750 MG tablet 360 tablet 3 9/18/2023 -- No   Sig - Route: Take 2 tablets (1,500 mg) by mouth 2 times daily TAKE 2 TABLETS BY MOUTH TWICE DAILY - Oral   Sent to pharmacy as: levETIRAcetam 750 MG Oral Tablet (KEPPRA)   Class: E-Prescribe   Order: [id number]   E-Prescribing Status: Receipt confirmed by pharmacy (9/18/2023 11:15 AM CDT)     Printout Tracking    External Result Report     Medication Administration Instructions    TAKE 2 TABLETS BY MOUTH TWICE DAILY     Pharmacy    [facility] #[id number] - [place], [place] - [facility] [address] AT [facility] [address]

## 2024-08-19 DIAGNOSIS — G40.209 PARTIAL SYMPTOMATIC EPILEPSY WITH COMPLEX PARTIAL SEIZURES, NOT INTRACTABLE, WITHOUT STATUS EPILEPTICUS (H): ICD-10-CM

## 2024-08-19 NOTE — TELEPHONE ENCOUNTER
Patient is scheduled to return for follow-up on 9/9/2024 and his Keppra can be refilled that day.  If he is running out we can give him 1 month supply.  Previously I never filled his vitamin D or B12 level.  His most recent vitamin D level was supratherapeutic and I do not see any reason to continue with vitamin D refill.  I can check his B12 level during his visit to decide if he needs B12 supplements.

## 2024-08-19 NOTE — TELEPHONE ENCOUNTER
[facility]    Phone Message    May a detailed message be left on voicemail: yes     Reason for Call: Medication Refill Request    Has the patient contacted the pharmacy for the refill? Yes   Name of medication being requested:   kEPPRA  Vitamin B12  Vitamin D3    Patient changed pharmacies      Provider who prescribed the medication:   Pharmacy: [facility] in [place], [place]  [facility]    Date medication is needed: ASAP   Patient has enough medication until 9/3/24    Action Taken: [facility]    Travel Screening: Not Applicable     Date of Service:

## 2024-08-20 NOTE — TELEPHONE ENCOUNTER
Patient has enough medication to get him through to his visit 9/9/24  Per previous TE's:    Per previous notes:  As mentioned several times in previous TE's-patient should contact primary care doctor for his vitamin b12 and vitamin d3

## 2024-08-23 RX ORDER — LEVETIRACETAM 750 MG/1
1500 TABLET ORAL 2 TIMES DAILY
Qty: 120 TABLET | Refills: 0 | Status: SHIPPED | OUTPATIENT
Start: 2024-08-23 | End: 2024-09-09

## 2024-08-23 NOTE — TELEPHONE ENCOUNTER
Refill request for: levETIRAcetam (KEPPRA) 750 MG tablet    Directions: TAKE 2 TABLETS BY MOUTH TWICE DAILY     LOV: 9/18/23  NOV: 9/9/24    30 day supply with 0 refills Medication T'd for review and signature  Rita Lopes CMA on 8/23/2024 at 2:34 PM  [facility]

## 2024-08-23 NOTE — TELEPHONE ENCOUNTER
Health Call Center    Phone Message    May a detailed message be left on voicemail: yes     Reason for Call: Medication Refill Request    Has the patient contacted the pharmacy for the refill? Yes   Name of medication being requested: kEPPRA    Provider who prescribed the medication: Dr. Covarrubias    Pharmacy: [facility] #[id number] - [place], [place] - [address] [place] [address] AT [facility] [address]    Date medication is needed: 08/26    Pt states they are almost out of medication, and will need a 1 month supply    Action Taken: Message routed to:  [facility]    Travel Screening: Not Applicable     Date of Service:

## 2024-09-05 NOTE — PROGRESS NOTES
NEUROLOGY FOLLOW UP VISIT  NOTE       [facility] [place]  [address] [place] [address]  Tel: [phone]  Fax: [phone]  [email or web address]     Joel Gray,  1975, MRN [id number]  PCP: Joan Fletcher  Date: 2024      ASSESSMENT & PLAN     Visit Diagnosis  Epilepsy with partial complex seizures (H)     Complex partial seizures  49-year-old male with history of complex partial seizures, pseudoseizures who returns for yearly follow-up.  He had a breakthrough seizure in 2024 but claims he is taking his medication regularly.  Previously he had an EEG last year that was unremarkable.  Last year his Keppra level was borderline elevated and no change was made.  I have recommended:    1.  Continue Keppra 1500 mg twice daily.  Prescriptions were filled for next year  2.  Check Keppra level and basic metabolic panel  3.  Follow-up in 1 year    Thank you again for this referral, please feel free to contact me if you have any questions.    Hang Covarrubias MD  [facility], [place]     HISTORY OF PRESENT ILLNESS     Patient is a 49-year-old male with history of complex partial seizure, pseudoseizures last seen on 2023 who is here for yearly follow-up.  During his last visit Keppra level was checked and was borderline elevated but as he was tolerating the dose no change was made.  Since his last visit he reports he had 1 seizure in July and claims he was taking his medication regularly.  He became unresponsive for short.    Patient started having seizures in  and according to his girlfriend he would have shaking at night that sounded more like shivering rather than a seizure. He was admitted to [place] [facility] and had 3 days of monitoring and although EEG was normal he was started on Depakote for mood stabilization. Since that admission he has been seen in the emergency room multiple times for seizure that were felt to be  pseudoseizure. While being on Depakote he developed pancreatitis and was taken off Depakote and switched to Keppra. Initially he had difficulty tolerating Keppra but afterwards adjusted to the dose. He was also evaluated by neuropsychology and was felt to have impaired range of overall intellectual ability and is being followed by psychological services for depression and impulse control disorder.     PROBLEM LIST   Patient Active Problem List   Diagnosis    Borderline intellectual functioning    Mixed anxiety depressive disorder    Dysphonia    Gastroesophageal reflux disease    Hypertension    Intermittent explosive disorder    Pseudoseizures    Psychosis (H)    Cobalamin deficiency    Epilepsy with partial complex seizures (H)    Obesity    Tobacco user    Vitamin D deficiency    Suicidal ideation    Aggressive behavior         PAST MEDICAL & SURGICAL HISTORY     Past Medical History:   Patient  has no past medical history on file.    Surgical History:  He  has a past surgical history that includes other surgical history; Cholecystectomy; and hernia repair.     SOCIAL HISTORY     Reviewed, and he  reports that he has quit smoking. He has never used smokeless tobacco. He reports that he does not currently use alcohol.     FAMILY HISTORY     Reviewed, and family history includes Diabetes in his mother; Hypertension in his mother; Seizure Disorder in his mother. He was adopted.     ALLERGIES     Allergies   Allergen Reactions    Aripiprazole      Other reaction(s): Agitation  Suicidal and terroristic thoughts    Acetaminophen Other (See Comments)     Seizure      Amoxicillin     Aspirin     Carbamazepine     Citalopram      Other reaction(s): Unknown    Codeine Other (See Comments)     seizure    Dexlansoprazole      Other reaction(s): Unknown    Divalproex Sodium [Valproic Acid] Other (See Comments)     Pancreatitis      Ibuprofen      Other reaction(s): rash    Iodine Other (See Comments)     Seizure      Morphine      Trazodone      Other reaction(s): Unknown    Diatrizoate Rash    Phenytoin Rash    Topamax [Topiramate] Other (See Comments)     Hallucinations           REVIEW OF SYSTEMS     A 12 point review of system was performed and was negative except as outlined in the history of present illness.     HOME MEDICATIONS     Current Outpatient Rx   Medication Sig Dispense Refill    celecoxib (CELEBREX) 200 MG capsule Take 200 mg by mouth daily      cholecalciferol (VITAMIN D3) 1250 mcg (03262 units) capsule Take 1,250 mcg by mouth once a week On Fridays      Cyanocobalamin (VITAMIN B-12 PO) Take 1 tablet by mouth daily      fenofibrate (LOFIBRA) 54 MG tablet Take 54 mg by mouth daily      hydrochlorothiazide (HYDRODIURIL) 25 MG tablet Take 25 mg by mouth daily      levETIRAcetam (KEPPRA) 750 MG tablet Take 2 tablets (1,500 mg) by mouth 2 times daily. 360 tablet 3    loratadine (CLARITIN) 10 MG tablet Take 10 mg by mouth daily      losartan (COZAAR) 50 MG tablet Take 50 mg by mouth 2 times daily      omeprazole (PRILOSEC) 20 MG DR capsule Take 20 mg by mouth daily      risperiDONE (RISPERDAL) 0.5 MG tablet Take 1 tablet (0.5 mg) by mouth every morning AND 2 tablets (1 mg) at bedtime. 90 tablet 0         PHYSICAL EXAM     Vital signs  /67 (BP Location: Right arm, Patient Position: Sitting)   Pulse 86   Wt 83.2 kg (183 lb 6.4 oz)   BMI 28.72 kg/m      Weight:   183 lbs 6.4 oz    Patient is alert and oriented x4 in no acute distress. Vital signs were reviewed and are documented in electronic medical record. Neck was supple, no carotid bruits, thyromegaly, JVD, or lymphadenopathy was noted.   NEUROLOGY EXAM:   Patient s speech was normal with no aphasia or dysarthria. Mentation, and affect were also normal.    Funduscopic exam was normal, with normal cup to disc ratio. Cranial nerves II -XII were intact.    Patient had normal mass, tone and motor strength was 5/5 in all extremities without pronator drift.    Sensation  was intact to light touch, pinprick, and vibratory sensation.    Reflexes were 1+ symmetrical with downgoing toes.    No dysmetria noted on FNF or HKS. Romberg was negative.   Gait testing was normal. Able to walk on toes/heels. Tandem walk normal.     PERTINENT DIAGNOSTIC STUDIES     Following studies were reviewed:     MRI 9/13/18  1. No acute/subacute infarcts, mass lesions, hydrocephalus or MRI evidence of intracranial hemorrhage. No abnormal intracranial enhancement.  2. Intracranial contents are stable compared to brain MRI 12/26/2014.     EEG 1/10/2022  Normal in wakefulness and drowsiness. Epileptiform discharges or seizures were not seen in this 31 minute recording.    EEG 9/13/18  Impression: This is a normal awake and drowsy EEG     PERTINENT LABS  Following labs were reviewed:  No visits with results within 3 Month(s) from this visit.   Latest known visit with results is:   Lab on 09/18/2023   Component Date Value Ref Range Status    Keppra (Levetiracetam) Level 09/18/2023 42.2 (H)  10.0 - 40.0  g/mL Final    Sodium 09/18/2023 137  136 - 145 mmol/L Final    Potassium 09/18/2023 2.9 (L)  3.4 - 5.3 mmol/L Final    Chloride 09/18/2023 94 (L)  98 - 107 mmol/L Final    Carbon Dioxide (CO2) 09/18/2023 33 (H)  22 - 29 mmol/L Final    Anion Gap 09/18/2023 10  7 - 15 mmol/L Final         Total time spent for face to face visit, reviewing labs/imaging studies, counseling and coordination of care was: 30 Minutes spent on the date of the encounter doing chart review, review of outside records, review of test results, interpretation of tests, patient visit, and documentation     The longitudinal plan of care for the diagnosis(es)/condition(s) as documented were addressed during this visit. Due to the added complexity in care, I will continue to support Joel in the subsequent management and with ongoing continuity of care.    This note was dictated using voice recognition software.  Any grammatical or context  distortions are unintentional and inherent to the software.    Orders Placed This Encounter   Procedures    Keppra (Levetiracetam) Level    Basic metabolic panel      New Prescriptions    No medications on file     Modified Medications    Modified Medication Previous Medication    LEVETIRACETAM (KEPPRA) 750 MG TABLET levETIRAcetam (KEPPRA) 750 MG tablet       Take 2 tablets (1,500 mg) by mouth 2 times daily.    Take 2 tablets (1,500 mg) by mouth 2 times daily. TAKE 2 TABLETS BY MOUTH TWICE DAILY

## 2024-09-09 ENCOUNTER — LAB REQUISITION (OUTPATIENT)
Dept: LAB | Facility: CLINIC | Age: 49
End: 2024-09-09
Payer: COMMERCIAL

## 2024-09-09 ENCOUNTER — LAB (OUTPATIENT)
Dept: LAB | Facility: HOSPITAL | Age: 49
End: 2024-09-09
Payer: COMMERCIAL

## 2024-09-09 ENCOUNTER — OFFICE VISIT (OUTPATIENT)
Dept: NEUROLOGY | Facility: CLINIC | Age: 49
End: 2024-09-09
Payer: COMMERCIAL

## 2024-09-09 VITALS
HEART RATE: 86 BPM | SYSTOLIC BLOOD PRESSURE: 109 MMHG | DIASTOLIC BLOOD PRESSURE: 67 MMHG | BODY MASS INDEX: 28.72 KG/M2 | WEIGHT: 183.4 LBS

## 2024-09-09 DIAGNOSIS — G40.209 PARTIAL SYMPTOMATIC EPILEPSY WITH COMPLEX PARTIAL SEIZURES, NOT INTRACTABLE, WITHOUT STATUS EPILEPTICUS (H): ICD-10-CM

## 2024-09-09 DIAGNOSIS — G40.209 EPILEPSY WITH PARTIAL COMPLEX SEIZURES (H): Primary | ICD-10-CM

## 2024-09-09 DIAGNOSIS — E78.1 PURE HYPERGLYCERIDEMIA: ICD-10-CM

## 2024-09-09 DIAGNOSIS — I10 ESSENTIAL (PRIMARY) HYPERTENSION: ICD-10-CM

## 2024-09-09 DIAGNOSIS — G40.209 EPILEPSY WITH PARTIAL COMPLEX SEIZURES (H): ICD-10-CM

## 2024-09-09 DIAGNOSIS — Z12.5 ENCOUNTER FOR SCREENING FOR MALIGNANT NEOPLASM OF PROSTATE: ICD-10-CM

## 2024-09-09 LAB
ANION GAP SERPL CALCULATED.3IONS-SCNC: 9 MMOL/L (ref 7–15)
BUN SERPL-MCNC: 18.3 MG/DL (ref 6–20)
CALCIUM SERPL-MCNC: 8.9 MG/DL (ref 8.8–10.4)
CHLORIDE SERPL-SCNC: 96 MMOL/L (ref 98–107)
CREAT SERPL-MCNC: 0.94 MG/DL (ref 0.67–1.17)
EGFRCR SERPLBLD CKD-EPI 2021: >90 ML/MIN/1.73M2
GLUCOSE SERPL-MCNC: 87 MG/DL (ref 70–99)
HCO3 SERPL-SCNC: 32 MMOL/L (ref 22–29)
LEVETIRACETAM SERPL-MCNC: 34.5 ΜG/ML (ref 10–40)
POTASSIUM SERPL-SCNC: 3.3 MMOL/L (ref 3.4–5.3)
SODIUM SERPL-SCNC: 137 MMOL/L (ref 135–145)

## 2024-09-09 PROCEDURE — 80177 DRUG SCRN QUAN LEVETIRACETAM: CPT

## 2024-09-09 PROCEDURE — G2211 COMPLEX E/M VISIT ADD ON: HCPCS | Performed by: PSYCHIATRY & NEUROLOGY

## 2024-09-09 PROCEDURE — 36415 COLL VENOUS BLD VENIPUNCTURE: CPT

## 2024-09-09 PROCEDURE — 80061 LIPID PANEL: CPT | Mod: ORL | Performed by: STUDENT IN AN ORGANIZED HEALTH CARE EDUCATION/TRAINING PROGRAM

## 2024-09-09 PROCEDURE — 80048 BASIC METABOLIC PNL TOTAL CA: CPT

## 2024-09-09 PROCEDURE — 99214 OFFICE O/P EST MOD 30 MIN: CPT | Performed by: PSYCHIATRY & NEUROLOGY

## 2024-09-09 PROCEDURE — G0103 PSA SCREENING: HCPCS | Mod: ORL | Performed by: STUDENT IN AN ORGANIZED HEALTH CARE EDUCATION/TRAINING PROGRAM

## 2024-09-09 RX ORDER — LEVETIRACETAM 750 MG/1
1500 TABLET ORAL 2 TIMES DAILY
Qty: 360 TABLET | Refills: 3 | Status: SHIPPED | OUTPATIENT
Start: 2024-09-09

## 2024-09-09 RX ORDER — LEVETIRACETAM 750 MG/1
1500 TABLET ORAL 2 TIMES DAILY
Qty: 360 TABLET | Refills: 3 | Status: SHIPPED | OUTPATIENT
Start: 2024-09-09 | End: 2024-09-09

## 2024-09-09 NOTE — TELEPHONE ENCOUNTER
Pt requests refill be sent to [facility] in [place], [place] instead.  Medication T'd for review and signature      Anju Lewis LPN on 9/9/2024 at 12:04 PM

## 2024-09-09 NOTE — LETTER
2024      Joel Gray  [address]  [facility] [address]      Dear Colleague,    Thank you for referring your patient, Joel Gray, to the [facility] [place]. Please see a copy of my visit note below.    NEUROLOGY FOLLOW UP VISIT  NOTE       [facility] [place]  [address] [place] [address]  Tel: [phone]  Fax: [phone]  [email or web address]     Joel Gray,  1975, MRN [id number]  PCP: Joan Fletcher  Date: 2024      ASSESSMENT & PLAN     Visit Diagnosis  Epilepsy with partial complex seizures (H)     Complex partial seizures  49-year-old male with history of complex partial seizures, pseudoseizures who returns for yearly follow-up.  He had a breakthrough seizure in 2024 but claims he is taking his medication regularly.  Previously he had an EEG last year that was unremarkable.  Last year his Keppra level was borderline elevated and no change was made.  I have recommended:    1.  Continue Keppra 1500 mg twice daily.  Prescriptions were filled for next year  2.  Check Keppra level and basic metabolic panel  3.  Follow-up in 1 year    Thank you again for this referral, please feel free to contact me if you have any questions.    Hang Covarrubias MD  [facility] NEUROLOG[facility], [place]     HISTORY OF PRESENT ILLNESS     Patient is a 49-year-old male with history of complex partial seizure, pseudoseizures last seen on 2023 who is here for yearly follow-up.  During his last visit Keppra level was checked and was borderline elevated but as he was tolerating the dose no change was made.  Since his last visit he reports he had 1 seizure in July and claims he was taking his medication regularly.  He became unresponsive for short.    Patient started having seizures in  and according to his girlfriend he would have shaking at night that sounded more like shivering rather than a seizure. He was admitted  to [place] [facility] and had 3 days of monitoring and although EEG was normal he was started on Depakote for mood stabilization. Since that admission he has been seen in the emergency room multiple times for seizure that were felt to be pseudoseizure. While being on Depakote he developed pancreatitis and was taken off Depakote and switched to Keppra. Initially he had difficulty tolerating Keppra but afterwards adjusted to the dose. He was also evaluated by neuropsychology and was felt to have impaired range of overall intellectual ability and is being followed by psychological services for depression and impulse control disorder.     PROBLEM LIST   Patient Active Problem List   Diagnosis     Borderline intellectual functioning     Mixed anxiety depressive disorder     Dysphonia     Gastroesophageal reflux disease     Hypertension     Intermittent explosive disorder     Pseudoseizures     Psychosis (H)     Cobalamin deficiency     Epilepsy with partial complex seizures (H)     Obesity     Tobacco user     Vitamin D deficiency     Suicidal ideation     Aggressive behavior         PAST MEDICAL & SURGICAL HISTORY     Past Medical History:   Patient  has no past medical history on file.    Surgical History:  He  has a past surgical history that includes other surgical history; Cholecystectomy; and hernia repair.     SOCIAL HISTORY     Reviewed, and he  reports that he has quit smoking. He has never used smokeless tobacco. He reports that he does not currently use alcohol.     FAMILY HISTORY     Reviewed, and family history includes Diabetes in his mother; Hypertension in his mother; Seizure Disorder in his mother. He was adopted.     ALLERGIES     Allergies   Allergen Reactions     Aripiprazole      Other reaction(s): Agitation  Suicidal and terroristic thoughts     Acetaminophen Other (See Comments)     Seizure       Amoxicillin      Aspirin      Carbamazepine      Citalopram      Other reaction(s): Unknown      Codeine Other (See Comments)     seizure     Dexlansoprazole      Other reaction(s): Unknown     Divalproex Sodium [Valproic Acid] Other (See Comments)     Pancreatitis       Ibuprofen      Other reaction(s): rash     Iodine Other (See Comments)     Seizure       Morphine      Trazodone      Other reaction(s): Unknown     Diatrizoate Rash     Phenytoin Rash     Topamax [Topiramate] Other (See Comments)     Hallucinations           REVIEW OF SYSTEMS     A 12 point review of system was performed and was negative except as outlined in the history of present illness.     HOME MEDICATIONS     Current Outpatient Rx   Medication Sig Dispense Refill     celecoxib (CELEBREX) 200 MG capsule Take 200 mg by mouth daily       cholecalciferol (VITAMIN D3) 1250 mcg (44201 units) capsule Take 1,250 mcg by mouth once a week On Fridays       Cyanocobalamin (VITAMIN B-12 PO) Take 1 tablet by mouth daily       fenofibrate (LOFIBRA) 54 MG tablet Take 54 mg by mouth daily       hydrochlorothiazide (HYDRODIURIL) 25 MG tablet Take 25 mg by mouth daily       levETIRAcetam (KEPPRA) 750 MG tablet Take 2 tablets (1,500 mg) by mouth 2 times daily. 360 tablet 3     loratadine (CLARITIN) 10 MG tablet Take 10 mg by mouth daily       losartan (COZAAR) 50 MG tablet Take 50 mg by mouth 2 times daily       omeprazole (PRILOSEC) 20 MG DR capsule Take 20 mg by mouth daily       risperiDONE (RISPERDAL) 0.5 MG tablet Take 1 tablet (0.5 mg) by mouth every morning AND 2 tablets (1 mg) at bedtime. 90 tablet 0         PHYSICAL EXAM     Vital signs  /67 (BP Location: Right arm, Patient Position: Sitting)   Pulse 86   Wt 83.2 kg (183 lb 6.4 oz)   BMI 28.72 kg/m      Weight:   183 lbs 6.4 oz    Patient is alert and oriented x4 in no acute distress. Vital signs were reviewed and are documented in electronic medical record. Neck was supple, no carotid bruits, thyromegaly, JVD, or lymphadenopathy was noted.   NEUROLOGY EXAM:    Patient s speech was normal  with no aphasia or dysarthria. Mentation, and affect were also normal.     Funduscopic exam was normal, with normal cup to disc ratio. Cranial nerves II -XII were intact.     Patient had normal mass, tone and motor strength was 5/5 in all extremities without pronator drift.     Sensation was intact to light touch, pinprick, and vibratory sensation.     Reflexes were 1+ symmetrical with downgoing toes.     No dysmetria noted on FNF or HKS. Romberg was negative.    Gait testing was normal. Able to walk on toes/heels. Tandem walk normal.     PERTINENT DIAGNOSTIC STUDIES     Following studies were reviewed:     MRI 9/13/18  1. No acute/subacute infarcts, mass lesions, hydrocephalus or MRI evidence of intracranial hemorrhage. No abnormal intracranial enhancement.  2. Intracranial contents are stable compared to brain MRI 12/26/2014.     EEG 1/10/2022  Normal in wakefulness and drowsiness. Epileptiform discharges or seizures were not seen in this 31 minute recording.    EEG 9/13/18  Impression: This is a normal awake and drowsy EEG     PERTINENT LABS  Following labs were reviewed:  No visits with results within 3 Month(s) from this visit.   Latest known visit with results is:   Lab on 09/18/2023   Component Date Value Ref Range Status     Keppra (Levetiracetam) Level 09/18/2023 42.2 (H)  10.0 - 40.0  g/mL Final     Sodium 09/18/2023 137  136 - 145 mmol/L Final     Potassium 09/18/2023 2.9 (L)  3.4 - 5.3 mmol/L Final     Chloride 09/18/2023 94 (L)  98 - 107 mmol/L Final     Carbon Dioxide (CO2) 09/18/2023 33 (H)  22 - 29 mmol/L Final     Anion Gap 09/18/2023 10  7 - 15 mmol/L Final         Total time spent for face to face visit, reviewing labs/imaging studies, counseling and coordination of care was: 30 Minutes spent on the date of the encounter doing chart review, review of outside records, review of test results, interpretation of tests, patient visit, and documentation     The longitudinal plan of care for the  diagnosis(es)/condition(s) as documented were addressed during this visit. Due to the added complexity in care, I will continue to support Joel in the subsequent management and with ongoing continuity of care.    This note was dictated using voice recognition software.  Any grammatical or context distortions are unintentional and inherent to the software.    Orders Placed This Encounter   Procedures     Keppra (Levetiracetam) Level     Basic metabolic panel      New Prescriptions    No medications on file     Modified Medications    Modified Medication Previous Medication    LEVETIRACETAM (KEPPRA) 750 MG TABLET levETIRAcetam (KEPPRA) 750 MG tablet       Take 2 tablets (1,500 mg) by mouth 2 times daily.    Take 2 tablets (1,500 mg) by mouth 2 times daily. TAKE 2 TABLETS BY MOUTH TWICE DAILY                 Again, thank you for allowing me to participate in the care of your patient.        Sincerely,        Hang Covarrubias MD

## 2024-09-09 NOTE — NURSING NOTE
Chief Complaint   Patient presents with    Follow Up     Return   Epilepsy with partial complex seizures   Pt had seizure on 07/27/24      Ysabel Quintanilla MA on 9/9/2024 at 11:27 AM

## 2024-09-10 LAB
ALBUMIN SERPL BCG-MCNC: 4.5 G/DL (ref 3.5–5.2)
ALP SERPL-CCNC: 99 U/L (ref 40–150)
ALT SERPL W P-5'-P-CCNC: 23 U/L (ref 0–70)
ANION GAP SERPL CALCULATED.3IONS-SCNC: 16 MMOL/L (ref 7–15)
AST SERPL W P-5'-P-CCNC: 25 U/L (ref 0–45)
BILIRUB SERPL-MCNC: 0.3 MG/DL
BUN SERPL-MCNC: 17.8 MG/DL (ref 6–20)
CALCIUM SERPL-MCNC: 9.3 MG/DL (ref 8.8–10.4)
CHLORIDE SERPL-SCNC: 96 MMOL/L (ref 98–107)
CHOLEST SERPL-MCNC: 228 MG/DL
CREAT SERPL-MCNC: 0.95 MG/DL (ref 0.67–1.17)
EGFRCR SERPLBLD CKD-EPI 2021: >90 ML/MIN/1.73M2
FASTING STATUS PATIENT QL REPORTED: ABNORMAL
FASTING STATUS PATIENT QL REPORTED: ABNORMAL
GLUCOSE SERPL-MCNC: 75 MG/DL (ref 70–99)
HCO3 SERPL-SCNC: 28 MMOL/L (ref 22–29)
HDLC SERPL-MCNC: 38 MG/DL
LDLC SERPL CALC-MCNC: 110 MG/DL
NONHDLC SERPL-MCNC: 190 MG/DL
POTASSIUM SERPL-SCNC: 3.3 MMOL/L (ref 3.4–5.3)
PROT SERPL-MCNC: 8.2 G/DL (ref 6.4–8.3)
PSA SERPL DL<=0.01 NG/ML-MCNC: 0.52 NG/ML (ref 0–3.5)
SODIUM SERPL-SCNC: 140 MMOL/L (ref 135–145)
TRIGL SERPL-MCNC: 399 MG/DL